# Patient Record
Sex: FEMALE | Race: WHITE | NOT HISPANIC OR LATINO | Employment: UNEMPLOYED | URBAN - METROPOLITAN AREA
[De-identification: names, ages, dates, MRNs, and addresses within clinical notes are randomized per-mention and may not be internally consistent; named-entity substitution may affect disease eponyms.]

---

## 2017-04-14 ENCOUNTER — OFFICE VISIT (OUTPATIENT)
Dept: URGENT CARE | Facility: URGENT CARE | Age: 27
End: 2017-04-14
Payer: COMMERCIAL

## 2017-04-14 VITALS
BODY MASS INDEX: 20.5 KG/M2 | DIASTOLIC BLOOD PRESSURE: 58 MMHG | WEIGHT: 127 LBS | RESPIRATION RATE: 14 BRPM | TEMPERATURE: 97.5 F | HEART RATE: 78 BPM | SYSTOLIC BLOOD PRESSURE: 102 MMHG | OXYGEN SATURATION: 99 %

## 2017-04-14 DIAGNOSIS — N89.8 VAGINAL DISCHARGE: Primary | ICD-10-CM

## 2017-04-14 DIAGNOSIS — B00.9 HERPES SIMPLEX VIRUS INFECTION: ICD-10-CM

## 2017-04-14 DIAGNOSIS — Z11.3 SCREEN FOR STD (SEXUALLY TRANSMITTED DISEASE): ICD-10-CM

## 2017-04-14 DIAGNOSIS — N89.8 VAGINAL SORE: ICD-10-CM

## 2017-04-14 LAB
MICRO REPORT STATUS: NORMAL
SPECIMEN SOURCE: NORMAL
WET PREP SPEC: NORMAL

## 2017-04-14 PROCEDURE — 99213 OFFICE O/P EST LOW 20 MIN: CPT | Performed by: FAMILY MEDICINE

## 2017-04-14 PROCEDURE — 87529 HSV DNA AMP PROBE: CPT | Mod: 91 | Performed by: FAMILY MEDICINE

## 2017-04-14 PROCEDURE — 87591 N.GONORRHOEAE DNA AMP PROB: CPT | Performed by: FAMILY MEDICINE

## 2017-04-14 PROCEDURE — 87491 CHLMYD TRACH DNA AMP PROBE: CPT | Performed by: FAMILY MEDICINE

## 2017-04-14 PROCEDURE — 87529 HSV DNA AMP PROBE: CPT | Performed by: FAMILY MEDICINE

## 2017-04-14 PROCEDURE — 87210 SMEAR WET MOUNT SALINE/INK: CPT | Performed by: FAMILY MEDICINE

## 2017-04-14 RX ORDER — ACYCLOVIR 400 MG/1
400 TABLET ORAL 3 TIMES DAILY
Qty: 30 TABLET | Refills: 0 | Status: SHIPPED | OUTPATIENT
Start: 2017-04-14 | End: 2024-01-25

## 2017-04-14 NOTE — PROGRESS NOTES
Chief Complaint   Patient presents with     Vaginal Problem     x2days blister on vaginal area, some discharge       STD     would like STD panel      SUBJECTIVE:   26 year old female complains of whitish vaginal discharge for 2 days.  Denies abnormal vaginal bleeding or significant pelvic pain or  She also has a sore in the rt vaginal area which is tender to touch   fever. No UTI symptoms. Denies history of known exposure to STD. Denies dyspareunia.    No LMP recorded.    OBJECTIVE:   She appears well, afebrile.  Abdomen: benign, soft, nontender, no masses.  Pelvic Exam: normal vagina and vulva, normal cervix without lesions or tenderness, uterus normal size anteverted, adenxa normal in size without tenderness.  Noticed  A 1cm sore in the rt labia minora  Urine dipstick: not done.    Results for orders placed or performed in visit on 04/14/17   Wet prep   Result Value Ref Range    Specimen Description Vagina     Wet Prep       No Trichomonas seen  No clue cells seen  No yeast seen      Micro Report Status FINAL 04/14/2017        ASSESSMENT:   Polly was seen today for vaginal problem and std.    Diagnoses and all orders for this visit:    Vaginal discharge  -     Wet prep    Vaginal sore  -     Herpes simplex culture    Screen for STD (sexually transmitted disease)  -     Neisseria gonorrhoeae PCR  -     Chlamydia trachomatis PCR    Herpes simplex virus infection  -     acyclovir (ZOVIRAX) 400 MG tablet; Take 1 tablet (400 mg) by mouth 3 times daily for 10 days          PLAN:   GC and chlamydia genprobe swabs sent to lab.  Treatment: Hygiene discussed  ROV prn if symptoms persist or worsen.

## 2017-04-14 NOTE — NURSING NOTE
"Chief Complaint   Patient presents with     Vaginal Problem     x2 blister on vaginal area, some discharge white        Initial /58  Pulse 78  Temp 97.5  F (36.4  C) (Oral)  Resp 14  Wt 127 lb (57.6 kg)  SpO2 99%  BMI 20.5 kg/m2 Estimated body mass index is 20.5 kg/(m^2) as calculated from the following:    Height as of 9/30/15: 5' 6\" (1.676 m).    Weight as of this encounter: 127 lb (57.6 kg).  Medication Reconciliation: complete       Carin Alvarado MA     "

## 2017-04-14 NOTE — MR AVS SNAPSHOT
"              After Visit Summary   2017    Polly Kent    MRN: [id number]           Patient Information     Date Of Birth          1990        Visit Information        Provider Department      2017 5:15 PM Zayda Lopez MD [facility]        Today's Diagnoses     Vaginal discharge    -  1    Vaginal sore        Screen for STD (sexually transmitted disease)        Herpes simplex virus infection           Follow-ups after your visit        Who to contact     If you have questions or need follow up information about today's clinic visit or your schedule please contact [facility] directly at [phone].  Normal or non-critical lab and imaging results will be communicated to you by [employer], letter or phone within 4 business days after the clinic has received the results. If you do not hear from us within 7 days, please contact the clinic through [employer] or phone. If you have a critical or abnormal lab result, we will notify you by phone as soon as possible.  Submit refill requests through [employer] or call your pharmacy and they will forward the refill request to us. Please allow 3 business days for your refill to be completed.          Additional Information About Your Visit        MyChart Information     [employer] lets you send messages to your doctor, view your test results, renew your prescriptions, schedule appointments and more. To sign up, go to [email or web address] . Click on \"Log in\" on the left side of the screen, which will take you to the Welcome page. Then click on \"Sign up Now\" on the right side of the page.     You will be asked to enter the access code listed below, as well as some personal information. Please follow the directions to create your username and password.     Your access code is: [email or web address]  Expires: 2017  6:53 PM     Your access code will  in 90 days. If you need help or a new code, please call " your [facility] clinic or [phone].        Care EveryWhere ID     This is your Care EveryWhere ID. This could be used by other organizations to access your [facility] medical records  [id number]        Your Vitals Were     Pulse Temperature Respirations Pulse Oximetry BMI (Body Mass Index)       78 97.5  F (36.4  C) (Oral) 14 99% 20.5 kg/m2        Blood Pressure from Last 3 Encounters:   04/14/17 102/58   12/15/16 112/54   12/02/16 120/72    Weight from Last 3 Encounters:   04/14/17 127 lb (57.6 kg)   12/15/16 125 lb (56.7 kg)   12/02/16 125 lb (56.7 kg)              We Performed the Following     Chlamydia trachomatis PCR     Herpes simplex culture     Neisseria gonorrhoeae PCR     Wet prep          Today's Medication Changes          These changes are accurate as of: 4/14/17  6:53 PM.  If you have any questions, ask your nurse or doctor.               Start taking these medicines.        Dose/Directions    acyclovir 400 MG tablet   Commonly known as:  ZOVIRAX   Used for:  Herpes simplex virus infection   Started by:  Zayda Lopez MD        Dose:  400 mg   Take 1 tablet (400 mg) by mouth 3 times daily for 10 days   Quantity:  30 tablet   Refills:  0            Where to get your medicines      These medications were sent to [email or web address] [id number] [place] [address] [address] AT [facility] [address]  [address], [facility] [address]    Hours:  24-hours Phone:  [phone]     acyclovir 400 MG tablet                Primary Care Provider    Physician No Ref-Primary       No address on file        Thank you!     Thank you for choosing [facility]  for your care. Our goal is always to provide you with excellent care. Hearing back from our patients is one way we can continue to improve our services. Please take a few minutes to complete the written survey that you may receive in the mail after your visit with us. Thank you!             Your Updated Medication List  - Protect others around you: Learn how to safely use, store and throw away your medicines at www.disposemymeds.org.          This list is accurate as of: 4/14/17  6:53 PM.  Always use your most recent med list.                   Brand Name Dispense Instructions for use    acyclovir 400 MG tablet    ZOVIRAX    30 tablet    Take 1 tablet (400 mg) by mouth 3 times daily for 10 days       albuterol 108 (90 BASE) MCG/ACT Inhaler    albuterol    1 Inhaler    Inhale 2 puffs into the lungs 4 times daily as needed for shortness of breath / dyspnea or wheezing       cetirizine 10 MG tablet    zyrTEC    30 tablet    Take 1 tablet (10 mg) by mouth every evening       ibuprofen 600 MG tablet    ADVIL/MOTRIN    30 tablet    Take 1 tablet (600 mg) by mouth every 6 hours as needed for moderate pain       methylPREDNISolone 4 MG tablet    MEDROL DOSEPAK    21 tablet    Follow package instructions

## 2017-04-15 LAB
SPECIMEN SOURCE: ABNORMAL
STATUS - QUEST: ABNORMAL
VZV SPEC QL CULT: ABNORMAL

## 2017-04-17 LAB
C TRACH DNA SPEC QL NAA+PROBE: NORMAL
HSV1 DNA SPEC QL NAA+PROBE: NEGATIVE
HSV2 DNA SPEC QL NAA+PROBE: NORMAL
N GONORRHOEA DNA SPEC QL NAA+PROBE: NORMAL
SPECIMEN SOURCE: NORMAL

## 2017-07-03 ENCOUNTER — TELEPHONE (OUTPATIENT)
Dept: URGENT CARE | Facility: URGENT CARE | Age: 27
End: 2017-07-03

## 2017-07-03 NOTE — TELEPHONE ENCOUNTER
Patient called asking for lab results from April. Notified of lab results. Patient requested to have the lab results printed out and was given [facility]. Pt had no further questions.

## 2019-03-19 ENCOUNTER — APPOINTMENT (OUTPATIENT)
Dept: OBGYN | Facility: CLINIC | Age: 29
End: 2019-03-19
Payer: COMMERCIAL

## 2022-05-04 ENCOUNTER — OFFICE VISIT (OUTPATIENT)
Dept: URGENT CARE | Facility: URGENT CARE | Age: 32
End: 2022-05-04
Payer: COMMERCIAL

## 2022-05-04 VITALS
TEMPERATURE: 98 F | HEART RATE: 67 BPM | WEIGHT: 136 LBS | RESPIRATION RATE: 16 BRPM | SYSTOLIC BLOOD PRESSURE: 117 MMHG | BODY MASS INDEX: 21.95 KG/M2 | OXYGEN SATURATION: 100 % | DIASTOLIC BLOOD PRESSURE: 70 MMHG

## 2022-05-04 DIAGNOSIS — N89.8 VAGINAL DISCHARGE: ICD-10-CM

## 2022-05-04 DIAGNOSIS — R30.0 DYSURIA: Primary | ICD-10-CM

## 2022-05-04 LAB
ALBUMIN UR-MCNC: ABNORMAL MG/DL
APPEARANCE UR: CLEAR
BACTERIA #/AREA URNS HPF: ABNORMAL /HPF
BILIRUB UR QL STRIP: NEGATIVE
CLUE CELLS: ABNORMAL
COLOR UR AUTO: YELLOW
GLUCOSE UR STRIP-MCNC: NEGATIVE MG/DL
HGB UR QL STRIP: ABNORMAL
KETONES UR STRIP-MCNC: 15 MG/DL
LEUKOCYTE ESTERASE UR QL STRIP: NEGATIVE
MUCOUS THREADS #/AREA URNS LPF: PRESENT /LPF
NITRATE UR QL: NEGATIVE
PH UR STRIP: 6.5 [PH] (ref 5–7)
RBC #/AREA URNS AUTO: ABNORMAL /HPF
SP GR UR STRIP: 1.02 (ref 1–1.03)
SQUAMOUS #/AREA URNS AUTO: ABNORMAL /LPF
TRICHOMONAS, WET PREP: ABNORMAL
UROBILINOGEN UR STRIP-ACNC: 1 E.U./DL
WBC #/AREA URNS AUTO: ABNORMAL /HPF
WBC'S/HIGH POWER FIELD, WET PREP: ABNORMAL
YEAST, WET PREP: ABNORMAL

## 2022-05-04 PROCEDURE — 81001 URINALYSIS AUTO W/SCOPE: CPT | Performed by: PHYSICIAN ASSISTANT

## 2022-05-04 PROCEDURE — 87210 SMEAR WET MOUNT SALINE/INK: CPT | Performed by: PHYSICIAN ASSISTANT

## 2022-05-04 PROCEDURE — 99204 OFFICE O/P NEW MOD 45 MIN: CPT | Performed by: PHYSICIAN ASSISTANT

## 2022-05-04 RX ORDER — CEFDINIR 300 MG/1
300 CAPSULE ORAL 2 TIMES DAILY
Qty: 20 CAPSULE | Refills: 0 | Status: SHIPPED | OUTPATIENT
Start: 2022-05-04 | End: 2022-05-14

## 2022-05-05 ENCOUNTER — TELEPHONE (OUTPATIENT)
Dept: PEDIATRICS | Facility: CLINIC | Age: 32
End: 2022-05-05
Payer: COMMERCIAL

## 2022-05-05 ENCOUNTER — TELEPHONE (OUTPATIENT)
Dept: URGENT CARE | Facility: URGENT CARE | Age: 32
End: 2022-05-05
Payer: COMMERCIAL

## 2022-05-05 NOTE — TELEPHONE ENCOUNTER
TO UC provider     Pt states she just missed a call from UC staff     No notes in chart currently - thinks it might be regarding results     Per pt please leave a detailed message    Bonny BROOKS, Triage RN  [facility]

## 2022-05-05 NOTE — TELEPHONE ENCOUNTER
The specimen that the patient left for STD testing , it spills all over the transport bag, do you want the patient to recollect?   Ramon Ga MA

## 2022-05-07 NOTE — TELEPHONE ENCOUNTER
Call the patient and she is feeling better, so she decided not to do the std testing.   Ramon Ga MA

## 2022-05-12 NOTE — PROGRESS NOTES
SUBJECTIVE:   Polly Kent is a 31 year old female who  presents today for a possible UTI. Symptoms of dysuria and frequency have been going on for 1week(s).  Hematuria no.  still presentand moderate.  There is no history of fever, chills, nausea or vomiting.  No history of vaginal or penile discharge. This patient does have a history of urinary tract infections. Patient denies long duration, rigors, flank pain, temperature > 101 degrees F., Vomiting, significant nausea or diarrhea, taking Coumadin and GFR less than 30 within the last year or vaginal discharge, vaginal odor, vaginal itching and dyspareunia (pain in labia/pelvis)    Has been taking ntrofurantoin with no relief     Past Medical History:   Diagnosis Date     Anemia     hx with previous child, none now     HSIL on Pap smear 6/2012    colp:VICKIE II & III referred to gyn/onc     Current Outpatient Medications   Medication Sig Dispense Refill     cefdinir (OMNICEF) 300 MG capsule Take 1 capsule (300 mg) by mouth 2 times daily for 10 days 20 capsule 0     acyclovir (ZOVIRAX) 400 MG tablet Take 1 tablet (400 mg) by mouth 3 times daily for 10 days 30 tablet 0     albuterol (ALBUTEROL) 108 (90 BASE) MCG/ACT Inhaler Inhale 2 puffs into the lungs 4 times daily as needed for shortness of breath / dyspnea or wheezing 1 Inhaler 0     cetirizine (ZYRTEC) 10 MG tablet Take 1 tablet (10 mg) by mouth every evening (Patient not taking: Reported on 5/4/2022) 30 tablet 1     ibuprofen (ADVIL,MOTRIN) 600 MG tablet Take 1 tablet (600 mg) by mouth every 6 hours as needed for moderate pain (Patient not taking: Reported on 5/4/2022) 30 tablet 0     methylPREDNISolone (MEDROL DOSEPAK) 4 MG tablet Follow package instructions (Patient not taking: Reported on 5/4/2022) 21 tablet 0     Social History     Tobacco Use     Smoking status: Never Smoker     Smokeless tobacco: Never Used   Substance Use Topics     Alcohol use: No       ROS:   Review of systems negative except as  stated above.    OBJECTIVE:  /70   Pulse 67   Temp 98  F (36.7  C) (Tympanic)   Resp 16   Wt 61.7 kg (136 lb)   SpO2 100%   BMI 21.95 kg/m    GENERAL APPEARANCE: healthy, alert and no distress  RESP: lungs clear to auscultation - no rales, rhonchi or wheezes  CV: regular rates and rhythm, normal S1 S2, no murmur noted  ABDOMEN:  soft, nontender, no HSM or masses and bowel sounds normal  BACK: No CVA tenderness  SKIN: no suspicious lesions or rashes  NEURO: Normal strength and tone, sensory exam grossly normal,  normal speech and mentation      Results for orders placed or performed in visit on 05/04/22   UA macro with reflex to Microscopic and Culture - Clinc Collect     Status: Abnormal    Specimen: Urine, Midstream   Result Value Ref Range    Color Urine Yellow Colorless, Straw, Light Yellow, Yellow    Appearance Urine Clear Clear    Glucose Urine Negative Negative mg/dL    Bilirubin Urine Negative Negative    Ketones Urine 15  (A) Negative mg/dL    Specific Gravity Urine 1.025 1.003 - 1.035    Blood Urine Small (A) Negative    pH Urine 6.5 5.0 - 7.0    Protein Albumin Urine Trace (A) Negative mg/dL    Urobilinogen Urine 1.0 0.2, 1.0 E.U./dL    Nitrite Urine Negative Negative    Leukocyte Esterase Urine Negative Negative   Urine Microscopic     Status: Abnormal   Result Value Ref Range    Bacteria Urine Few (A) None Seen /HPF    RBC Urine None Seen 0-2 /HPF /HPF    WBC Urine 5-10 (A) 0-5 /HPF /HPF    Squamous Epithelials Urine Moderate (A) None Seen /LPF    Mucus Urine Present (A) None Seen /LPF    Narrative    Urine Culture not indicated   Wet prep - Clinic Collect     Status: Abnormal    Specimen: Vagina; Swab   Result Value Ref Range    Trichomonas Absent Absent    Yeast Absent Absent    Clue Cells Absent Absent    WBCs/high power field 1+ (A) None       ASSESSMENT:   (R30.0) Dysuria  (primary encounter diagnosis)  Plan: UA macro with reflex to Microscopic and Culture        - Clinc Collect, Urine  Microscopic, cefdinir         (OMNICEF) 300 MG capsule      (N89.8) Vaginal discharge  Plan: Wet prep - Clinic Collect, NEISSERIA GONORRHOEA        PCR, CHLAMYDIA TRACHOMATIS PCR      Red flags and emergent follow up discussed, and understood by patient  Follow up with PCP if symptoms worsen or fail to improve      There are no Patient Instructions on file for this visit.

## 2022-06-05 ENCOUNTER — HEALTH MAINTENANCE LETTER (OUTPATIENT)
Age: 32
End: 2022-06-05

## 2022-10-15 ENCOUNTER — HEALTH MAINTENANCE LETTER (OUTPATIENT)
Age: 32
End: 2022-10-15

## 2023-06-17 ENCOUNTER — HEALTH MAINTENANCE LETTER (OUTPATIENT)
Age: 33
End: 2023-06-17

## 2024-01-12 ENCOUNTER — TELEPHONE (OUTPATIENT)
Dept: MIDWIFE SERVICES | Facility: CLINIC | Age: 34
End: 2024-01-12
Payer: COMMERCIAL

## 2024-01-12 ENCOUNTER — PRE VISIT (OUTPATIENT)
Dept: MATERNAL FETAL MEDICINE | Facility: CLINIC | Age: 34
End: 2024-01-12
Payer: COMMERCIAL

## 2024-01-12 ENCOUNTER — TRANSCRIBE ORDERS (OUTPATIENT)
Dept: MATERNAL FETAL MEDICINE | Facility: CLINIC | Age: 34
End: 2024-01-12
Payer: COMMERCIAL

## 2024-01-12 DIAGNOSIS — Z34.81 ENCOUNTER FOR SUPERVISION OF OTHER NORMAL PREGNANCY IN FIRST TRIMESTER: Primary | ICD-10-CM

## 2024-01-12 DIAGNOSIS — O26.90 PREGNANCY RELATED CONDITION, ANTEPARTUM: Primary | ICD-10-CM

## 2024-01-12 NOTE — TELEPHONE ENCOUNTER
[facility] Call Center    Phone Message    May a detailed message be left on voicemail: yes     Reason for Call: Other: Pt calling in trying to schedule her initial ob appointments. Pt is really wanting the genetic testing done and pt is already 11w 6d today. Writer reached out in secure chat to ask what is the time frame that she can get that done at. Nurse responded with 11w-13w6d. Writer found pt a first provider visit at that 13w 5d morena. Lynner was able to find an US the day before that appointment. The problem that the writer is having is with the nurse ob intake appointment happens after the US but before the provider visit. Writer did schedule the first available ob intake . Please call pt to try to schedule the ob intake before the US. Thank you!     Action Taken: Message routed to:  Other: obgyn    Travel Screening: Not Applicable

## 2024-01-12 NOTE — TELEPHONE ENCOUNTER
Pt won't see MD until 13w5d, wanting genetic counseling  Order placed for MFM referral/genetic screen  Called pt to inform her of MFM referral, no answer, VM full.  Grisel Keen, CINDY on 1/12/2024 at 12:43 PM    Pt scheduled for MFM visits. Closing TE.  Grisel Keen RN on 1/12/2024 at 4:10 PM

## 2024-01-16 ENCOUNTER — HOSPITAL ENCOUNTER (OUTPATIENT)
Dept: ULTRASOUND IMAGING | Facility: CLINIC | Age: 34
Discharge: HOME | End: 2024-01-16
Attending: OBSTETRICS & GYNECOLOGY
Payer: COMMERCIAL

## 2024-01-16 ENCOUNTER — OFFICE VISIT (OUTPATIENT)
Dept: MATERNAL FETAL MEDICINE | Facility: CLINIC | Age: 34
End: 2024-01-16
Attending: OBSTETRICS & GYNECOLOGY
Payer: COMMERCIAL

## 2024-01-16 DIAGNOSIS — Z36.9 FIRST TRIMESTER SCREENING: Primary | ICD-10-CM

## 2024-01-16 DIAGNOSIS — O26.90 PREGNANCY RELATED CONDITION, ANTEPARTUM: ICD-10-CM

## 2024-01-16 PROCEDURE — 76813 OB US NUCHAL MEAS 1 GEST: CPT | Mod: 26 | Performed by: OBSTETRICS & GYNECOLOGY

## 2024-01-16 PROCEDURE — 76813 OB US NUCHAL MEAS 1 GEST: CPT

## 2024-01-16 NOTE — PROGRESS NOTES
"Please see \"Imaging\" tab under \"Chart Review\" for details of today's US at the [facility].    Abel Alvarez MD  Maternal-Fetal Medicine    "

## 2024-01-24 ENCOUNTER — ANCILLARY PROCEDURE (OUTPATIENT)
Dept: ULTRASOUND IMAGING | Facility: CLINIC | Age: 34
End: 2024-01-24
Attending: ADVANCED PRACTICE MIDWIFE
Payer: COMMERCIAL

## 2024-01-24 DIAGNOSIS — Z32.01 PREGNANCY TEST POSITIVE: Primary | ICD-10-CM

## 2024-01-24 DIAGNOSIS — Z34.90 SUPERVISION OF NORMAL PREGNANCY: Primary | ICD-10-CM

## 2024-01-24 DIAGNOSIS — Z32.01 PREGNANCY TEST POSITIVE: ICD-10-CM

## 2024-01-24 LAB
ABO/RH(D): NORMAL
ANTIBODY SCREEN: NEGATIVE
SPECIMEN EXPIRATION DATE: NORMAL

## 2024-01-24 PROCEDURE — 76801 OB US < 14 WKS SINGLE FETUS: CPT | Mod: 26 | Performed by: OBSTETRICS & GYNECOLOGY

## 2024-01-24 PROCEDURE — 76801 OB US < 14 WKS SINGLE FETUS: CPT

## 2024-01-25 ENCOUNTER — VIRTUAL VISIT (OUTPATIENT)
Dept: OBGYN | Facility: CLINIC | Age: 34
End: 2024-01-25
Payer: COMMERCIAL

## 2024-01-25 ENCOUNTER — MEDICAL CORRESPONDENCE (OUTPATIENT)
Dept: HEALTH INFORMATION MANAGEMENT | Facility: CLINIC | Age: 34
End: 2024-01-25

## 2024-01-25 ENCOUNTER — PRENATAL OFFICE VISIT (OUTPATIENT)
Dept: MIDWIFE SERVICES | Facility: CLINIC | Age: 34
End: 2024-01-25
Payer: COMMERCIAL

## 2024-01-25 ENCOUNTER — LAB (OUTPATIENT)
Dept: LAB | Facility: CLINIC | Age: 34
End: 2024-01-25
Attending: ADVANCED PRACTICE MIDWIFE
Payer: COMMERCIAL

## 2024-01-25 ENCOUNTER — OFFICE VISIT (OUTPATIENT)
Dept: MATERNAL FETAL MEDICINE | Facility: CLINIC | Age: 34
End: 2024-01-25
Attending: ADVANCED PRACTICE MIDWIFE
Payer: COMMERCIAL

## 2024-01-25 VITALS — BODY MASS INDEX: 21.95 KG/M2 | HEIGHT: 66 IN

## 2024-01-25 VITALS
SYSTOLIC BLOOD PRESSURE: 106 MMHG | BODY MASS INDEX: 21.47 KG/M2 | DIASTOLIC BLOOD PRESSURE: 58 MMHG | HEART RATE: 92 BPM | WEIGHT: 133 LBS | OXYGEN SATURATION: 99 %

## 2024-01-25 DIAGNOSIS — O09.92 ENCOUNTER FOR SUPERVISION OF HIGH RISK PREGNANCY IN SECOND TRIMESTER, ANTEPARTUM: ICD-10-CM

## 2024-01-25 DIAGNOSIS — O26.90 PREGNANCY RELATED CONDITION, ANTEPARTUM: ICD-10-CM

## 2024-01-25 DIAGNOSIS — N96 HISTORY OF MULTIPLE MISCARRIAGES: Primary | ICD-10-CM

## 2024-01-25 DIAGNOSIS — Z36.9 PRENATAL SCREENING ENCOUNTER: Primary | ICD-10-CM

## 2024-01-25 DIAGNOSIS — Z36.9 PRENATAL SCREENING ENCOUNTER: ICD-10-CM

## 2024-01-25 DIAGNOSIS — Z23 NEED FOR TDAP VACCINATION: ICD-10-CM

## 2024-01-25 DIAGNOSIS — Z12.4 SCREENING FOR CERVICAL CANCER: ICD-10-CM

## 2024-01-25 DIAGNOSIS — Z34.83 ENCOUNTER FOR SUPERVISION OF OTHER NORMAL PREGNANCY IN THIRD TRIMESTER: Primary | ICD-10-CM

## 2024-01-25 PROBLEM — S76.109A INJURY OF QUADRICEPS MUSCLE: Status: ACTIVE | Noted: 2022-07-07

## 2024-01-25 PROBLEM — O99.013 ANEMIA DURING PREGNANCY IN THIRD TRIMESTER: Status: RESOLVED | Noted: 2019-10-30 | Resolved: 2024-01-25

## 2024-01-25 PROBLEM — Z86.2 HISTORY OF ANEMIA: Status: ACTIVE | Noted: 2021-02-25

## 2024-01-25 PROBLEM — S87.82XA: Status: ACTIVE | Noted: 2017-11-20

## 2024-01-25 PROBLEM — O99.013 ANEMIA DURING PREGNANCY IN THIRD TRIMESTER: Status: ACTIVE | Noted: 2019-10-30

## 2024-01-25 PROBLEM — N92.1 MENORRHAGIA WITH IRREGULAR CYCLE: Status: ACTIVE | Noted: 2021-02-25

## 2024-01-25 LAB
ALBUMIN UR-MCNC: NEGATIVE MG/DL
APPEARANCE UR: CLEAR
BILIRUB UR QL STRIP: NEGATIVE
COLOR UR AUTO: YELLOW
ERYTHROCYTE [DISTWIDTH] IN BLOOD BY AUTOMATED COUNT: 12 % (ref 10–15)
GLUCOSE UR STRIP-MCNC: NEGATIVE MG/DL
HCT VFR BLD AUTO: 32.4 % (ref 35–47)
HGB BLD-MCNC: 10.7 G/DL (ref 11.7–15.7)
HGB UR QL STRIP: NEGATIVE
KETONES UR STRIP-MCNC: NEGATIVE MG/DL
LEUKOCYTE ESTERASE UR QL STRIP: NEGATIVE
MCH RBC QN AUTO: 28.6 PG (ref 26.5–33)
MCHC RBC AUTO-ENTMCNC: 33 G/DL (ref 31.5–36.5)
MCV RBC AUTO: 87 FL (ref 78–100)
NITRATE UR QL: NEGATIVE
PH UR STRIP: 6 [PH] (ref 5–7)
PLATELET # BLD AUTO: 226 10E3/UL (ref 150–450)
RBC # BLD AUTO: 3.74 10E6/UL (ref 3.8–5.2)
SP GR UR STRIP: 1.02 (ref 1–1.03)
UROBILINOGEN UR STRIP-ACNC: 0.2 E.U./DL
WBC # BLD AUTO: 7.7 10E3/UL (ref 4–11)

## 2024-01-25 PROCEDURE — 86900 BLOOD TYPING SEROLOGIC ABO: CPT | Performed by: ADVANCED PRACTICE MIDWIFE

## 2024-01-25 PROCEDURE — G0124 SCREEN C/V THIN LAYER BY MD: HCPCS | Performed by: PATHOLOGY

## 2024-01-25 PROCEDURE — 86803 HEPATITIS C AB TEST: CPT | Performed by: ADVANCED PRACTICE MIDWIFE

## 2024-01-25 PROCEDURE — 81003 URINALYSIS AUTO W/O SCOPE: CPT | Performed by: ADVANCED PRACTICE MIDWIFE

## 2024-01-25 PROCEDURE — 87340 HEPATITIS B SURFACE AG IA: CPT | Performed by: ADVANCED PRACTICE MIDWIFE

## 2024-01-25 PROCEDURE — 99203 OFFICE O/P NEW LOW 30 MIN: CPT | Performed by: ADVANCED PRACTICE MIDWIFE

## 2024-01-25 PROCEDURE — 86901 BLOOD TYPING SEROLOGIC RH(D): CPT | Performed by: ADVANCED PRACTICE MIDWIFE

## 2024-01-25 PROCEDURE — 87624 HPV HI-RISK TYP POOLED RSLT: CPT | Performed by: ADVANCED PRACTICE MIDWIFE

## 2024-01-25 PROCEDURE — G0145 SCR C/V CYTO,THINLAYER,RESCR: HCPCS | Performed by: ADVANCED PRACTICE MIDWIFE

## 2024-01-25 PROCEDURE — 96040 HC GENETIC COUNSELING, EACH 30 MINUTES: CPT | Performed by: GENETIC COUNSELOR, MS

## 2024-01-25 PROCEDURE — 36415 COLL VENOUS BLD VENIPUNCTURE: CPT | Performed by: ADVANCED PRACTICE MIDWIFE

## 2024-01-25 PROCEDURE — 87389 HIV-1 AG W/HIV-1&-2 AB AG IA: CPT | Performed by: ADVANCED PRACTICE MIDWIFE

## 2024-01-25 PROCEDURE — 86762 RUBELLA ANTIBODY: CPT | Performed by: ADVANCED PRACTICE MIDWIFE

## 2024-01-25 PROCEDURE — 36415 COLL VENOUS BLD VENIPUNCTURE: CPT

## 2024-01-25 PROCEDURE — 86780 TREPONEMA PALLIDUM: CPT | Performed by: ADVANCED PRACTICE MIDWIFE

## 2024-01-25 PROCEDURE — 86850 RBC ANTIBODY SCREEN: CPT | Performed by: ADVANCED PRACTICE MIDWIFE

## 2024-01-25 PROCEDURE — 87086 URINE CULTURE/COLONY COUNT: CPT | Performed by: ADVANCED PRACTICE MIDWIFE

## 2024-01-25 PROCEDURE — 85027 COMPLETE CBC AUTOMATED: CPT | Performed by: ADVANCED PRACTICE MIDWIFE

## 2024-01-25 RX ORDER — NITROFURANTOIN 25; 75 MG/1; MG/1
100 CAPSULE ORAL 2 TIMES DAILY
COMMUNITY
End: 2024-02-15

## 2024-01-25 RX ORDER — ACETAMINOPHEN 500 MG
1000 TABLET ORAL
COMMUNITY
Start: 2022-07-07 | End: 2024-01-25

## 2024-01-25 NOTE — PROGRESS NOTES
14w0d   Polly Kent is a 33 year old who presents to the clinic for an new ob visit.  She is a previous CNM patient. Pregnancy is going well so far, no sickness.   Estimated Date of Delivery: Jul 25, 2024 is calculated from Patient's last menstrual period was 10/19/2023.     She has had bleeding since her LMP.  The bleeding was spotting on two occasions after intercourse and hasn't happened recently.  She has had mild nausea. Weigh loss has not occurred.   This was not a planned pregnancy.   FOB is involved,  Aarion     INFECTION HISTORY  HIV: no  Hepatitis B: no  Hepatitis C: no  Syphilis:  no  Tuberculosis: no   PPD- no  Herpes self: no  Herpes partner:  no  Chlamydia:  no  Gonorrhea:  no  HPV: no  BV:  no  Trichomonis:  no  Chicken Pox:  YES  ====================================================  GENETIC SCREENING  Genetic screening reviewed. High Risk? Had NIPT today  ====================================================  PERSONAL/SOCIAL HISTORY  Lives lives with their family.  Employment: some part time work, sometimes home with kids.  Her job involves light activity .  HX OF ABUSE: no  =====================================================   REVIEW OF SYSTEMS  CONSTITUTIONAL: NEGATIVE for fever, chills  EYES: NEGATIVE for vision changes   RESP: NEGATIVE for significant cough or SOB  CV: NEGATIVE for chest pain, palpitations   GI: NEGATIVE for nausea, abdominal pain, heartburn, or change in bowel habits  : NEGATIVE for frequency, dysuria, or hematuria  MUSCULOSKELETAL: NEGATIVE for significant arthralgias or myalgia  NEURO: NEGATIVE for weakness, dizziness or paresthesias or headache  ====================================================    PHYSICAL EXAM:  /58   Pulse 92   Wt 60.3 kg (133 lb)   LMP 10/19/2023   SpO2 99%   BMI 21.47 kg/m    BMI- Body mass index is 21.47 kg/m .,     RECOMMENDED WEIGHT GAIN: 25-35 lbs.  PHQ9- Today's Depression Rating was 0  GENERAL:  Pleasant pregnant female,  alert, well groomed.   SKIN:  Warm and dry, without lesions or rashes  HEAD: Symmetrical features.  EYES:  PERRLA,   MOUTH:  Buccal mucosa pink, moist without lesions.    NECK:  Thyroid without enlargement and nodules.  Lymph nodes not palpable.   LUNGS:  Clear to auscultation.  BREAST:  Symmetrical.  No dominant, fixed or suspicious masses are noted.  No skin or nipple changes or axillary nodes.  Self exam is taught and encouraged.  Nipples everted.      HEART:  RRR without murmur.  ABDOMEN: Soft without masses , tenderness or organomegaly.  No CVA tenderness. No scars noted.     MUSCULOSKELETAL:  Full range of motion  EXTREMITIES:  No edema. No significant varicosities.     GENITALIA:  BUS WNL, no lesions noted   VAGINA:  Pink, normal rugae and discharge normal and physiologic,   CERVIX:  smooth, without discharge or CMT ; Pap smear collected      =========================================  ASSESSMENT:  14w0d,  14w0d  (Z34.83) Encounter for supervision of other normal pregnancy in third trimester  (primary encounter diagnosis)  Comment: NOB labs today. RTC in 3-4 weeks for msAFP, then in 6 weeks for anatomy US  Plan: US OB > 14 Weeks    (Z12.4) Screening for cervical cancer  Plan: Pap imaged thin layer screen with HPV -         recommended age 30 - 65 years (select HPV order        below)      PREGNANCY AT RISK? no  ==========================================  PLAN:  Instructed on use of triage nurse line and contacting the on call CNM after hours for an urgent need such as fever, vagina bleeding, bladder or vaginal infection, rupture of membranes,  or term labor.    Discussed the indications, uses for and false positives for quad screen, nuchal translucency and fetal survey ultrasound at 18-20 weeks gestation. Will inform us at the next visit if she wished to avail herself of these screens.  Instructed on best evidence for: weight gain for her BMI for pregnancy; healthy diet and foods to  avoid; exercise and activity during pregnancy;avoiding exposure to toxoplasmosis; and maintenance of a generally healthy lifestyle.   Discussed the harms, benefits, side effects and alternative therapies for current prescribed and OTC medications.    RUBEN SidhuM
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
## 2024-01-25 NOTE — PROGRESS NOTES
[facility]  Genetic Counseling Consult    Patient:  Ploly Kent  Preferred Name: Polly YOB: 1990   Date of Service:  1/25/24   MRN: [id number]    Polly was seen at the [facility] for genetic consultation. The indication for genetic counseling is desire to discuss options for genetic screening and diagnostics. The patient was accompanied to this visit by their partner.    The session was conducted in English.      IMPRESSION/ PLAN   1. Polly has not had genetic screening in this pregnancy but elected to have screening today.     2. During today's [facility] visit, Polly had a blood draw for expanded non-invasive prenatal testing (also called NIPT, NIPS, or cell-free DNA) through [facility]. The expanded NIPT screens for trisomy 21, 18, and 13 and 22q11.2 deletion syndrome. The patient opted to screen for sex chromosome aneuploidies, including reported fetal sex.  In accordance with current guidelines, other microdeletion syndromes and rare autosomal trisomies were not included, but reflex to include these conditions remains available with expanded NIPT if there is an indication later in the pregnancy. Results are expected in 7-14 days. The patient will be called with results and if they do not answer they requested a detailed message with results on their voicemail, including the predicted fetal sex information.  Patient was informed that results, including fetal sex, will be available in [facility].    3. Since the patient chose aneuploidy screening via NIPT, quad screen is NOT recommended in the second trimester. If the patient desires screening for open neural tube defects, maternal serum AFP only is recommended, ideally between 16-18 weeks gestation.    4. Polly had a nuchal translucency ultrasound on 01/16/2024. Please see the ultrasound report for further details.    5. Further recommendations include a fetal  "anatomy ultrasound around 18-20 weeks, which will most likely be completed with their primary OB provider.    PREGNANCY HISTORY   /Parity:       [place] pregnancy history is significant for:   08/10: Term, female  : Term, male  10/19: Term, female  2 first trimester miscarriages with separate partners  4 abortions without complications per patient report    CURRENT PREGNANCY   Current Age: 33 year old   Age at Delivery: 33 year old  CINDY: 2024, by Last Menstrual Period                                   Gestational Age: 14w0d  This pregnancy is a single gestation.   This pregnancy was conceived spontaneously.    MEDICAL HISTORY   Polly rodriguez reported medical history is not expected to impact pregnancy management or risks to fetal development.       FAMILY HISTORY   A three-generation pedigree was obtained today and is scanned under the \"Media\" tab in Epic. The family history was reported by Polly and their partner.    The following significant findings were reported today:   The father of the pregnancy, Jl, is healthy. His family history is unremarkable. He has one healthy daughter from a previous partner  Polly has two daughters and a son, all healthy all from separate partners.    Polly's maternal uncle was born with bilateral deafness, for reasons unknown to Polly. He has a son born with cleft lip and palate but no hearing loss. Her uncle and cousin have no other health concerns  We reviewed that hearing loss is relatively common in the human population in that profound congenital hearing loss is estimated to occur in about 1 in 1000 births.  Approximately half of cases are thought to be due to environmental factors (acoustic trauma, ototoxic drug exposure, and bacterial or viral infections such as rubella or cytomegalovirus) and half due to genetic causes. The majority (70%) of cases of congenital deafness associated with genetic factors are classified as non-syndromic (the deafness is not " associated with other clinical findings that define a recognized syndrome). In the remaining 30%, one of more than 400 forms of syndromic deafness can be diagnosed because of other associated clinical findings.  It is difficult to determine the recurrence risk for family members without a known cause for the hearing loss. Inheritance patterns of genetic hearing loss can include AD, AR, X-linked and mitochondrial.    Karl has one niece and one nephew with autism  Autism spectrum disorders (ASD) are characterized by abnormalities in language and social cognition. Individuals with ASD typically have difficulty with communication and interaction with others, intellectual disability, restricted interests, and repetitive behaviors. The cause of ASD is currently unknown. In less than 1% of all individuals with ASD, it is a feature of a certain genetic condition such as Down syndrome, fragile X syndrome, or Rett syndrome. However, in most cases the cause may be multifactorial which means a combination of genetic and environmental factors. The genetic causes or predispositions are being researched and many have been suggested. Currently, family and twin studies are most useful to provide recurrence risks to individuals with a family history of ASD. If an individual is affected with ASD there is an estimated 2-18.7% chance for a sibling of the individual to also be affected. Estimates for second degree relatives are currently unknown but could be elevated from the general population risk.    Otherwise, the reported family history is unremarkable for multiple miscarriages, stillbirths, birth defects, intellectual disabilities, known genetic conditions, and consanguinity.       RISK ASSESSMENT FOR INHERITED CONDITIONS AND CARRIER SCREENING OPTIONS   Expanded carrier screening is available to screen for autosomal recessive conditions and X-linked conditions in a large list of genes. Carrier screening does not test the  pregnancy but gives a risk assessment for the pregnancy and future pregnancies to have the condition. Expanded carrier screening is designed to identify carrier status for conditions that are primarily childhood or adolescent onset. Expanded carrier screening does not evaluate for adult-onset conditions such as hereditary cancer syndromes, dementia/ Alzheimer's disease, or cardiovascular disease risk factors. Additionally, expanded carrier screening is not comprehensive for all known genetic diseases or inherited conditions. Carrier screening does not test for all genetic and health conditions or risk factors.     Autosomal recessive conditions happen when a mutation has been inherited from the egg and sperm and include conditions like cystic fibrosis, thalassemia, hearing loss, spinal muscular atrophy, and more. We reviewed that when both biological parents carry a harmful genetic change in a gene associated with autosomal recessive inheritance, each of their pregnancies has a 1 in 4 (25%) chance to be affected by that condition. X-linked conditions happen when a mutation has been inherited from the egg and include conditions like fragile X syndrome.With x-linked conditions, the specific risk generally depends on the chromosomal sex of the fetus, with XY individuals (generally male) being most severely affected.     [place] screening was reviewed. [place]  Screening    Polly's daughter (from different partner) is a carrier of sickle cell disease, identified on  screening. In  Polly did have hemoglobin S testing which was not detected, which means Polly is NOT a carrier for sickle cell disease and her daughter inherited that from her father. Polly did not have comprehensive hemoglobin electrophoresis so this result does not provide a risk assessment on other hemoglobinopathies     The patient declined the carrier screening options and declined a thorough discussion of the options. They are aware  the option will remain, and they can contact us if they would like to pursue screening. See below for the more detailed information we dicussed.    RISK ASSESSMENT FOR CHROMOSOME CONDITIONS   We explained that the risk for fetal chromosome abnormalities increases with maternal age. We discussed specific features of common chromosome abnormalities, including Down syndrome, trisomy 13, trisomy 18, and sex chromosome trisomies.    At age 33 at midtrimester, the risk to have a baby with Down syndrome is 1 in 421.   At age 33 at midtrimester, the risk to have a baby with any chromosome abnormality is 1 in 217.    Polly has not had genetic screening in this pregnancy but elected to have screening today.      GENETIC TESTING OPTIONS   Genetic testing during a pregnancy includes screening and diagnostic procedures.      Screening tests are non-invasive which means no risk to the pregnancy and includes ultrasounds and blood work. The benefits and limitations of screening were reviewed. Screening tests provide a risk assessment (chance) specific to the pregnancy for certain fetal chromosome abnormalities but cannot definitively diagnose or exclude a fetal chromosome abnormality. Follow-up genetic counseling and consideration of diagnostic testing is recommended with any abnormal screening result. Diagnostic testing during a pregnancy is more certain and can test for more conditions. However, the tests do have a risk of miscarriage that requires careful consideration. These tests can detect fetal chromosome abnormalities with greater than 99% certainty. Results can be compromised by maternal cell contamination or mosaicism and are limited by the resolution of current genetic testing technology.     There is no screening or diagnostic test that detects all forms of birth defects or intellectual disability.     We discussed the following screening options:     Non-invasive prenatal testing (NIPT)  Also called cell-free DNA  screening because it detects chromosomes from the placenta in the pregnant person's blood  Can be done any time after 10 weeks gestation  Standard recommendation for NIPT screens for trisomy 21, trisomy 18, trisomy 13, with the option of adding sex chromosome aneuploidies, without or without predicted sex  Cannot screen for open neural tube defects, maternal serum AFP after 15 weeks is recommended  New NIPT options include screening for other trisomies, microdeletion syndromes, and in some cases fetal blood antigens. Guidelines do not recommend these conditions are included in standard screening. These options have limitations and should be discussed with a genetic counselor.   However, current (2023) ACMG guidelines do recommend that screening for one microdeletion syndrome, called 22q11.2 deletion syndrome be offered to all pregnant patients. 22q11.2 deletion syndrome has an estimated prevalence of 1 in 990 to 1 in 2148 (0.05-0.1%). Risk is not thought to increase with maternal age. Clinical features are variable but include congenital heart defects, cleft palate, developmental delays, immune system deficiencies, and hearing loss. Approximately 90% of cases are de samuel (a sporadic new change in a pregnancy). Cell-free DNA screening for 22q11.2 deletion syndrome is available (Expanded NIPS through [employer]). We discussed the limitations of cell-free DNA screening in detecting microdeletions and the possiblity of false positives and false negatives. Expanded NIPS also allows for reflex to include other microdeletion conditions and rare autosomal trisomies if an indication would arise later in the pregnancy. The patient opted into 22q11.2 deletion syndrome screening as part of the expanded NIPT option.     We discussed the following ultrasound options:    Nuchal translucency (NT) ultrasound  Ultrasound between [id number]-32e3x that includes nuchal translucency measurement and nasal bone assessments  Nuchal  translucency refers to the space at the back of the neck where fluid builds up. All babies at this stage have fluid and there is only concern if there is too much fluid  Nasal bone refers to the small bone in the nose. There is concern for conditions like Down syndrome if the bone cannot be seen at all  This ultrasound can be done as part of first trimester screening, at the same time as another screen (NIPT), at the same time as a CVS, or if the patients does not want genetic screening.  Markers on ultrasound detects about 70% of pregnancies with aneuploidy  Abnormalities on NT ultrasound can also increase the risk for a birth defect, like a heart defect    Comprehensive level II ultrasound (Fetal Anatomy Ultrasound)  Ultrasound done between 18-20 weeks gestation  Screens for major birth defects and markers for aneuploidy (like trisomy 21 and trisomy 18)  Includes looking at the fetus/baby's growth, heart, organs (stomach, kidneys), placenta, and amniotic fluid    We discussed the following diagnostic options:     Amniocentesis  Invasive diagnostic procedure done after 15 weeks gestation  The procedure collects a small sample of amniotic fluid for the purpose of chromosomal testing and/or other genetic testing  Diagnostic result; more than 99% sensitivity for fetal chromosome abnormalities  Testing for AFP in the amniotic fluid can test for open neural tube defects    It was a pleasure to be involved with [facility]. Face-to-face time of the meeting was 45 minutes.    Sandy Fitch GC, MS, [facility]  Board Certified and [place] Licensed Genetic Counselor   [facility]  Maternal Fetal Medicine  Office: [phone]  [facility]: [phone]   Fax: [phone]  [facility]

## 2024-01-26 LAB
HBV SURFACE AG SERPL QL IA: NONREACTIVE
HCV AB SERPL QL IA: NONREACTIVE
HIV 1+2 AB+HIV1 P24 AG SERPL QL IA: NONREACTIVE
RUBV IGG SERPL QL IA: 6.13 INDEX
RUBV IGG SERPL QL IA: POSITIVE
T PALLIDUM AB SER QL: NONREACTIVE

## 2024-01-27 LAB — BACTERIA UR CULT: NORMAL

## 2024-01-30 ENCOUNTER — TELEPHONE (OUTPATIENT)
Dept: MATERNAL FETAL MEDICINE | Facility: CLINIC | Age: 34
End: 2024-01-30
Payer: COMMERCIAL

## 2024-01-30 DIAGNOSIS — O99.012 ANEMIA DURING PREGNANCY IN SECOND TRIMESTER: Primary | ICD-10-CM

## 2024-01-30 LAB — SCANNED LAB RESULT: NORMAL

## 2024-01-30 RX ORDER — MULTIVIT WITH MINERALS/LUTEIN
250 TABLET ORAL DAILY
Qty: 90 TABLET | Refills: 0 | Status: SHIPPED | OUTPATIENT
Start: 2024-01-30 | End: 2024-04-29

## 2024-01-30 RX ORDER — CYANOCOBALAMIN (VITAMIN B-12) 500 MCG
1000 TABLET ORAL DAILY
Qty: 180 TABLET | Refills: 0 | Status: SHIPPED | OUTPATIENT
Start: 2024-01-30 | End: 2024-04-29

## 2024-01-30 NOTE — TELEPHONE ENCOUNTER
Called Polly and discussed their normal NIPT result.      The results are negative and consistent with two copies of chromosomes 21, 18, and 13.     Sex chromosome analysis was also included and the result is negative which is consistent with two copies of sex chromosomes. The sex chromosome results were XY. The predicted male sex was disclosed per patient's wishes.    The results are also negative and consistent with no microdeletion in 22q11.21 region.     This puts her current pregnancy at low risk for Down syndrome, trisomy 18, trisomy 13 and sex chromosome abnormalities. This test is reported to have a greater than 99% sensitivity for trisomy 21, trisomy 18, and trisomy 13, and monosomy X. Sensitivity data is not available for individual sex chromosome aneuploidies and 22q11.2 microdeletion (if included). Although these results are reassuring, this does not replace a standard chromosome analysis from a chorionic villus sampling or amniocentesis.     Fetal anatomy ultrasound is recommended. This ultrasound will likely be completed with the patient's primary OB provider.     MSAFP is the appropriate second trimester screening test for open neural tube defects; the maternal quad screen is not recommended.    Her results are available in her Epic chart for her primary OB provider to review.    Sandy Fitch MS, [facility]  Licensed Genetic Counselor   [facility]  Maternal Fetal Medicine  [email or web address]  [email or web address]  Office: [phone]  Pager [phone]  [facility]: [phone]   Fax: [phone]

## 2024-01-31 LAB
BKR LAB AP GYN ADEQUACY: ABNORMAL
BKR LAB AP GYN INTERPRETATION: ABNORMAL
BKR LAB AP HPV REFLEX: ABNORMAL
BKR LAB AP PREVIOUS ABNORMAL: ABNORMAL
PATH REPORT.COMMENTS IMP SPEC: ABNORMAL
PATH REPORT.COMMENTS IMP SPEC: ABNORMAL
PATH REPORT.RELEVANT HX SPEC: ABNORMAL

## 2024-02-01 LAB
HUMAN PAPILLOMA VIRUS 16 DNA: NEGATIVE
HUMAN PAPILLOMA VIRUS 18 DNA: NEGATIVE
HUMAN PAPILLOMA VIRUS FINAL DIAGNOSIS: NORMAL
HUMAN PAPILLOMA VIRUS OTHER HR: NEGATIVE

## 2024-02-02 ENCOUNTER — PATIENT OUTREACH (OUTPATIENT)
Dept: MIDWIFE SERVICES | Facility: CLINIC | Age: 34
End: 2024-02-02

## 2024-02-02 DIAGNOSIS — R87.613 HSIL ON PAP SMEAR OF CERVIX: Primary | ICD-10-CM

## 2024-02-15 ENCOUNTER — PRENATAL OFFICE VISIT (OUTPATIENT)
Dept: MIDWIFE SERVICES | Facility: CLINIC | Age: 34
End: 2024-02-15
Payer: COMMERCIAL

## 2024-02-15 VITALS
OXYGEN SATURATION: 100 % | DIASTOLIC BLOOD PRESSURE: 63 MMHG | WEIGHT: 135.8 LBS | SYSTOLIC BLOOD PRESSURE: 103 MMHG | BODY MASS INDEX: 21.92 KG/M2 | HEART RATE: 77 BPM | RESPIRATION RATE: 16 BRPM | TEMPERATURE: 98.2 F

## 2024-02-15 DIAGNOSIS — Z34.83 ENCOUNTER FOR SUPERVISION OF OTHER NORMAL PREGNANCY IN THIRD TRIMESTER: Primary | ICD-10-CM

## 2024-02-15 PROCEDURE — 87563 M. GENITALIUM AMP PROBE: CPT | Mod: 90 | Performed by: ADVANCED PRACTICE MIDWIFE

## 2024-02-15 PROCEDURE — 99000 SPECIMEN HANDLING OFFICE-LAB: CPT | Performed by: ADVANCED PRACTICE MIDWIFE

## 2024-02-15 PROCEDURE — 87798 DETECT AGENT NOS DNA AMP: CPT | Mod: 90 | Performed by: ADVANCED PRACTICE MIDWIFE

## 2024-02-15 PROCEDURE — 36415 COLL VENOUS BLD VENIPUNCTURE: CPT | Performed by: ADVANCED PRACTICE MIDWIFE

## 2024-02-15 PROCEDURE — 99207 PR PRENATAL VISIT: CPT | Performed by: ADVANCED PRACTICE MIDWIFE

## 2024-02-15 PROCEDURE — 82105 ALPHA-FETOPROTEIN SERUM: CPT | Mod: 90 | Performed by: ADVANCED PRACTICE MIDWIFE

## 2024-02-15 NOTE — PROGRESS NOTES
Here for PNV and need for AFP.  No other concerns   Will set up for next visit with US for fetal survey.  ASSESSMENT: 17w0d   PLAN: RTC 2-3 weeks for visit.

## 2024-02-17 LAB
# FETUSES US: NORMAL
AFP MOM SERPL: 0.89
AFP SERPL-MCNC: 41 NG/ML
AGE - REPORTED: 33.7 YR
CURRENT SMOKER: NO
FAMILY MEMBER DISEASES HX: NO
GA METHOD: NORMAL
GA: NORMAL WK
IDDM PATIENT QL: NO
INTEGRATED SCN PATIENT-IMP: NORMAL
SPECIMEN DRAWN SERPL: NORMAL

## 2024-02-18 NOTE — RESULT ENCOUNTER NOTE
Dear Polly,    Your test results are attached below. Great news. Your screening test for neural tube defects is negative.  If you have any questions, please contact me via [employer] or you can call our office at [phone].    Barbara Ross CNM, APRN RICH, CNM

## 2024-02-19 LAB
M GENITALIUM DNA SPEC QL NAA+PROBE: NOT DETECTED
M HOMINIS DNA SPEC QL NAA+PROBE: NOT DETECTED
U PARVUM DNA SPEC QL NAA+PROBE: ABNORMAL
U UREALYTICUM DNA SPEC QL NAA+PROBE: NOT DETECTED

## 2024-02-19 NOTE — RESULT ENCOUNTER NOTE
Dear Polly,    Your test results are attached below. Your test was negative for mycoplasma and ureaplasma. It was inconclusive for one type of ureaplasma which shows us that you do not have a clear infection with these organisms. If you continue to have symptoms please let us know and we can do an exam and possibly further testing.  If you have any questions, please contact me via [employer] or you can call our office at [phone].    Barbara Ross, RICH, RUBEN VILLANUEVA, CNM

## 2024-03-07 ENCOUNTER — PRENATAL OFFICE VISIT (OUTPATIENT)
Dept: MIDWIFE SERVICES | Facility: CLINIC | Age: 34
End: 2024-03-07
Attending: ADVANCED PRACTICE MIDWIFE
Payer: COMMERCIAL

## 2024-03-07 ENCOUNTER — ANCILLARY PROCEDURE (OUTPATIENT)
Dept: ULTRASOUND IMAGING | Facility: CLINIC | Age: 34
End: 2024-03-07
Attending: ADVANCED PRACTICE MIDWIFE
Payer: COMMERCIAL

## 2024-03-07 VITALS
WEIGHT: 140.9 LBS | DIASTOLIC BLOOD PRESSURE: 72 MMHG | BODY MASS INDEX: 22.74 KG/M2 | SYSTOLIC BLOOD PRESSURE: 116 MMHG | TEMPERATURE: 98 F | HEART RATE: 78 BPM | OXYGEN SATURATION: 100 %

## 2024-03-07 DIAGNOSIS — Z34.83 ENCOUNTER FOR SUPERVISION OF OTHER NORMAL PREGNANCY IN THIRD TRIMESTER: ICD-10-CM

## 2024-03-07 DIAGNOSIS — O26.892 HEARTBURN IN PREGNANCY IN SECOND TRIMESTER: ICD-10-CM

## 2024-03-07 DIAGNOSIS — R12 HEARTBURN IN PREGNANCY IN SECOND TRIMESTER: ICD-10-CM

## 2024-03-07 DIAGNOSIS — Z34.83 ENCOUNTER FOR SUPERVISION OF OTHER NORMAL PREGNANCY IN THIRD TRIMESTER: Primary | ICD-10-CM

## 2024-03-07 PROCEDURE — 99207 PR PRENATAL VISIT: CPT | Performed by: ADVANCED PRACTICE MIDWIFE

## 2024-03-07 PROCEDURE — 76805 OB US >/= 14 WKS SNGL FETUS: CPT | Performed by: OBSTETRICS & GYNECOLOGY

## 2024-03-07 NOTE — PROGRESS NOTES
20w0d  Fetal survey today measuring within 4 days of gestation by LMP; CINDY remains 7/25/2024. No fetal quickening yet. Reports some mild headaches that improve with water. Also endorses persistent nausea, and reflux. Educated on physiologic changes r/t blood volume and GI motility during pregnancy; encouraged to increase water intake between meals, eat small frequent meals, avoid spicy foods, and avoid laying horizontal for 1-2 hours after eating. Encouraged to try TUMS but patient reports she doesn't tolerate the taste of TUMS, Rx for omeprazole sent to preferred pharmacy. Patient reports taking PO iron, reviewed will recheck levels at 24 weeks with GCT. Discussed indication for colposcopy; encouraged to schedule at .    Denies water leaking, vaginal bleeding, decreased fetal movement, contraction pain, or other concerns. Danger signs and preeclampsia signs discussed. Encouraged to call triage with concerns and has phone numbers to call. RTC 4 weeks for GCT, Hgb, second trimester syphilis screen.    VARUN Horn    Attestation   I was present with the RICH student who participated in the service and in the documentation of the services provided. I have verified the history and personally performed the physical exam and medical decision making, as documented by the student and edited by me.  RUBEN Muniz, RICH

## 2024-04-04 ENCOUNTER — PRENATAL OFFICE VISIT (OUTPATIENT)
Dept: MIDWIFE SERVICES | Facility: CLINIC | Age: 34
End: 2024-04-04
Payer: COMMERCIAL

## 2024-04-04 VITALS
WEIGHT: 147.2 LBS | HEART RATE: 84 BPM | SYSTOLIC BLOOD PRESSURE: 108 MMHG | OXYGEN SATURATION: 100 % | BODY MASS INDEX: 23.76 KG/M2 | DIASTOLIC BLOOD PRESSURE: 65 MMHG

## 2024-04-04 DIAGNOSIS — Z34.82 ENCOUNTER FOR SUPERVISION OF OTHER NORMAL PREGNANCY, SECOND TRIMESTER: Primary | ICD-10-CM

## 2024-04-04 LAB
GLUCOSE 1H P 50 G GLC PO SERPL-MCNC: 75 MG/DL (ref 70–129)
HGB BLD-MCNC: 11.4 G/DL (ref 11.7–15.7)
T PALLIDUM AB SER QL: NONREACTIVE

## 2024-04-04 PROCEDURE — 36415 COLL VENOUS BLD VENIPUNCTURE: CPT | Performed by: ADVANCED PRACTICE MIDWIFE

## 2024-04-04 PROCEDURE — 82950 GLUCOSE TEST: CPT | Performed by: ADVANCED PRACTICE MIDWIFE

## 2024-04-04 PROCEDURE — 86780 TREPONEMA PALLIDUM: CPT | Performed by: ADVANCED PRACTICE MIDWIFE

## 2024-04-04 PROCEDURE — 99207 PR PRENATAL VISIT: CPT | Performed by: ADVANCED PRACTICE MIDWIFE

## 2024-04-04 NOTE — PROGRESS NOTES
24w0d  Polly is here with Jayy for prenatal visit. She is feeling well. Baby is active, no bleeding, leaking of fluid, headaches. She did not  her omeprazole. Heartburn is worst with eating chocolate. Denies constipation. Discussed how PPI works, and that if she decides to take it, she should take it every day to suppress acid production. Doing GCT/hgb/trep today. Next visit in 4 weeks.  Ad Eckert CNM

## 2024-04-15 ENCOUNTER — OFFICE VISIT (OUTPATIENT)
Dept: OBGYN | Facility: CLINIC | Age: 34
End: 2024-04-15
Payer: COMMERCIAL

## 2024-04-15 VITALS
OXYGEN SATURATION: 100 % | BODY MASS INDEX: 23.73 KG/M2 | DIASTOLIC BLOOD PRESSURE: 67 MMHG | HEART RATE: 91 BPM | SYSTOLIC BLOOD PRESSURE: 108 MMHG | WEIGHT: 147 LBS

## 2024-04-15 DIAGNOSIS — K40.90 UNILATERAL INGUINAL HERNIA WITHOUT OBSTRUCTION OR GANGRENE, RECURRENCE NOT SPECIFIED: ICD-10-CM

## 2024-04-15 DIAGNOSIS — R87.610 ATYPICAL SQUAMOUS CELL CHANGES OF UNDETERMINED SIGNIFICANCE (ASCUS) ON CERVICAL CYTOLOGY WITH NEGATIVE HIGH RISK HUMAN PAPILLOMA VIRUS (HPV) TEST RESULT: Primary | ICD-10-CM

## 2024-04-15 PROCEDURE — 57452 EXAM OF CERVIX W/SCOPE: CPT | Performed by: OBSTETRICS & GYNECOLOGY

## 2024-04-15 NOTE — PROGRESS NOTES
Polly Kent is a 33 year old year old female  who presents for initial colposcopy, referred. Pap smear on 2024 showed: ASCUS HPV negative.     Had pap in 2012 HSIL, no treatment but delivered a baby after that. Has spot in left groin to look at today, larger and not sure if lymph node or something else??    Patient's last menstrual period was 10/19/2023.  UPT today is not done  Patient does smoke Marijuana  Type of contraception: none--pregnant  Age at first sexual intercourse:   Number of sexual partners (lifetime):   Past GYN history:   Prior cervical/vaginal disease: VICKIE 3.  Prior cervical treatment: no treatment.      PROCEDURE:      Before the procedure, it was ensured that the patient was educated regarding the nature of her findings to date, the implications, and what was to be done. She has been made aware of the role of HPV, the natural history of infection, ways to minimize her future risk, the effect of HPV on the cervix, and treatment options available should they be indicated. The details of the colposcopic procedure were reviewed. All questions were answered before proceeding, and informed consent was therefore obtained.      Speculum placed in vagina and excellent visualization of cervix acheived, cervix swabbed x 3 with acetic acid solution.      FINDINGS:  Cervix: acetowhitening noted 6 o'clock  Please refer to images section for details.  SCJ seen?: no   ECC done?: No  Satisfactory examination?: yes    Left groin with bulging felt jenna with standing position and about 1.5 cm defect in inguinal canal. No lymph nodes are enlarged.       ASSESSMENT: Normal exam without visible pathology.  Left inguinal hernia    PLAN: plan repeat pap smear one year with HPV. Discussed importance of follow-up when she is NOT pregnant. Abnormal pap was >10 yrs ago.     Discussed her hernia and what signs would require ER visit. Reviewed outpatient surgical referral after pregnancy for repair. Answered  questions.    Sidra Thurman MD

## 2024-04-15 NOTE — PATIENT INSTRUCTIONS

## 2024-04-16 PROBLEM — K40.90 UNILATERAL INGUINAL HERNIA WITHOUT OBSTRUCTION OR GANGRENE, RECURRENCE NOT SPECIFIED: Status: ACTIVE | Noted: 2024-04-16

## 2024-04-29 ENCOUNTER — PATIENT OUTREACH (OUTPATIENT)
Dept: MIDWIFE SERVICES | Facility: CLINIC | Age: 34
End: 2024-04-29
Payer: COMMERCIAL

## 2024-04-29 DIAGNOSIS — R87.613 HSIL ON PAP SMEAR OF CERVIX: Primary | ICD-10-CM

## 2024-04-29 NOTE — TELEPHONE ENCOUNTER
4/15/24 Mason: ASSESSMENT: Normal exam without visible pathology. PLAN: plan repeat pap smear one year with HPV. Discussed importance of follow-up when she is NOT pregnant. Abnormal pap was >10 yrs ago.

## 2024-04-30 ENCOUNTER — PRENATAL OFFICE VISIT (OUTPATIENT)
Dept: MIDWIFE SERVICES | Facility: CLINIC | Age: 34
End: 2024-04-30
Payer: COMMERCIAL

## 2024-04-30 VITALS
SYSTOLIC BLOOD PRESSURE: 104 MMHG | WEIGHT: 151.2 LBS | BODY MASS INDEX: 24.4 KG/M2 | OXYGEN SATURATION: 100 % | DIASTOLIC BLOOD PRESSURE: 67 MMHG | HEART RATE: 96 BPM

## 2024-04-30 DIAGNOSIS — Z34.82 ENCOUNTER FOR SUPERVISION OF OTHER NORMAL PREGNANCY IN SECOND TRIMESTER: Primary | ICD-10-CM

## 2024-04-30 PROCEDURE — 99207 PR PRENATAL VISIT: CPT | Performed by: ADVANCED PRACTICE MIDWIFE

## 2024-04-30 NOTE — PROGRESS NOTES
27w5d  Polly is here with Jayy today. Feeling well. Reports good fetal movement. Denies leaking of fluid, vaginal bleeding, regular uterine contractions, headache, visual changes, or other concerns. Discussed upcoming TDAP. Jayy is up to date with TDAP with recently physical. RTC in 2 weeks.    RUBEN Sidhu CNM

## 2024-05-17 ENCOUNTER — PRENATAL OFFICE VISIT (OUTPATIENT)
Dept: MIDWIFE SERVICES | Facility: CLINIC | Age: 34
End: 2024-05-17
Payer: COMMERCIAL

## 2024-05-17 VITALS
DIASTOLIC BLOOD PRESSURE: 69 MMHG | HEART RATE: 90 BPM | OXYGEN SATURATION: 96 % | BODY MASS INDEX: 24.05 KG/M2 | WEIGHT: 149 LBS | SYSTOLIC BLOOD PRESSURE: 104 MMHG

## 2024-05-17 DIAGNOSIS — K40.91 UNILATERAL RECURRENT INGUINAL HERNIA WITHOUT OBSTRUCTION OR GANGRENE: Primary | ICD-10-CM

## 2024-05-17 PROCEDURE — 90715 TDAP VACCINE 7 YRS/> IM: CPT | Performed by: ADVANCED PRACTICE MIDWIFE

## 2024-05-17 PROCEDURE — 90471 IMMUNIZATION ADMIN: CPT | Performed by: ADVANCED PRACTICE MIDWIFE

## 2024-05-17 PROCEDURE — 99207 PR PRENATAL VISIT: CPT | Performed by: ADVANCED PRACTICE MIDWIFE

## 2024-05-17 NOTE — PROGRESS NOTES
30w1d  Polly is here with Jayy today. Reports good fetal movement. Denies leaking of fluid, vaginal bleeding, regular uterine contractions, headache, visual changes, or other concerns. Feels like inguinal hernia has been more uncomfortable and exaccerbated as pregnancy progresses. Discussed hot packs, Tylenol if needed, and recommended pregnancy support belt. Fitted for one today. Accepts TDAP today. RTC in 2 weeks.    RUBEN Sidhu CNM

## 2024-05-30 ENCOUNTER — PRENATAL OFFICE VISIT (OUTPATIENT)
Dept: MIDWIFE SERVICES | Facility: CLINIC | Age: 34
End: 2024-05-30
Payer: COMMERCIAL

## 2024-05-30 VITALS
SYSTOLIC BLOOD PRESSURE: 112 MMHG | RESPIRATION RATE: 16 BRPM | OXYGEN SATURATION: 100 % | HEART RATE: 94 BPM | WEIGHT: 149.9 LBS | DIASTOLIC BLOOD PRESSURE: 67 MMHG | BODY MASS INDEX: 24.19 KG/M2 | TEMPERATURE: 97.9 F

## 2024-05-30 DIAGNOSIS — Z34.83 ENCOUNTER FOR SUPERVISION OF OTHER NORMAL PREGNANCY IN THIRD TRIMESTER: Primary | ICD-10-CM

## 2024-05-30 PROCEDURE — 99207 PR PRENATAL VISIT: CPT | Performed by: ADVANCED PRACTICE MIDWIFE

## 2024-05-30 NOTE — PROGRESS NOTES
32w0d lo Matias is doing well, here with partner Jayy. Reports intermittent BHCs, nothing regular. Denies bleeding, loss of fluid, or decreased fetal movement. Does report pressure in epigastric area with sitting, resolves with position change. Discussed likely normal d/t baby's position, but did review preeclampsia precautions.  Discussed weight gain her per request - over 24lb twg with minimal weight gain since 28wks. States this is normal, especially as she isn't reporting issues with nausea or vomiting that are preventing her from eating, and we are more concerned with weight trends over time. PTL precautions reviewed and discussed how to contact CNMs 24/7    RTC 2wks  RUBEN Birmingham, RICH

## 2024-06-01 ENCOUNTER — NURSE TRIAGE (OUTPATIENT)
Dept: NURSING | Facility: CLINIC | Age: 34
End: 2024-06-01
Payer: COMMERCIAL

## 2024-06-01 ENCOUNTER — HOSPITAL ENCOUNTER (OUTPATIENT)
Facility: CLINIC | Age: 34
Discharge: HOME | End: 2024-06-01
Attending: ADVANCED PRACTICE MIDWIFE | Admitting: ADVANCED PRACTICE MIDWIFE
Payer: COMMERCIAL

## 2024-06-01 ENCOUNTER — HOSPITAL ENCOUNTER (EMERGENCY)
Facility: CLINIC | Age: 34
End: 2024-06-01
Payer: COMMERCIAL

## 2024-06-01 VITALS — RESPIRATION RATE: 16 BRPM | DIASTOLIC BLOOD PRESSURE: 65 MMHG | TEMPERATURE: 98.6 F | SYSTOLIC BLOOD PRESSURE: 102 MMHG

## 2024-06-01 PROCEDURE — 59025 FETAL NON-STRESS TEST: CPT | Mod: 26 | Performed by: ADVANCED PRACTICE MIDWIFE

## 2024-06-01 PROCEDURE — 99213 OFFICE O/P EST LOW 20 MIN: CPT | Mod: 25 | Performed by: ADVANCED PRACTICE MIDWIFE

## 2024-06-01 PROCEDURE — G0463 HOSPITAL OUTPT CLINIC VISIT: HCPCS | Mod: 25

## 2024-06-01 PROCEDURE — 59025 FETAL NON-STRESS TEST: CPT

## 2024-06-01 PROCEDURE — 96374 THER/PROPH/DIAG INJ IV PUSH: CPT

## 2024-06-01 PROCEDURE — 999N000105 HC STATISTIC NO DOCUMENTATION TO SUPPORT CHARGE

## 2024-06-01 PROCEDURE — 258N000003 HC RX IP 258 OP 636: Performed by: ADVANCED PRACTICE MIDWIFE

## 2024-06-01 PROCEDURE — 96361 HYDRATE IV INFUSION ADD-ON: CPT

## 2024-06-01 PROCEDURE — 250N000011 HC RX IP 250 OP 636: Performed by: ADVANCED PRACTICE MIDWIFE

## 2024-06-01 RX ORDER — ONDANSETRON 2 MG/ML
4 INJECTION INTRAMUSCULAR; INTRAVENOUS EVERY 6 HOURS PRN
Status: DISCONTINUED | OUTPATIENT
Start: 2024-06-01 | End: 2024-06-01 | Stop reason: HOSPADM

## 2024-06-01 RX ORDER — DEXTROSE, SODIUM CHLORIDE, SODIUM LACTATE, POTASSIUM CHLORIDE, AND CALCIUM CHLORIDE 5; .6; .31; .03; .02 G/100ML; G/100ML; G/100ML; G/100ML; G/100ML
500 INJECTION, SOLUTION INTRAVENOUS ONCE
Status: COMPLETED | OUTPATIENT
Start: 2024-06-01 | End: 2024-06-01

## 2024-06-01 RX ADMIN — ONDANSETRON 4 MG: 2 INJECTION INTRAMUSCULAR; INTRAVENOUS at 20:08

## 2024-06-01 RX ADMIN — SODIUM CHLORIDE, POTASSIUM CHLORIDE, SODIUM LACTATE AND CALCIUM CHLORIDE 500 ML: 600; 310; 30; 20 INJECTION, SOLUTION INTRAVENOUS at 20:12

## 2024-06-01 RX ADMIN — SODIUM CHLORIDE, SODIUM LACTATE, POTASSIUM CHLORIDE, CALCIUM CHLORIDE AND DEXTROSE MONOHYDRATE 500 ML: 5; 600; 310; 30; 20 INJECTION, SOLUTION INTRAVENOUS at 20:45

## 2024-06-01 ASSESSMENT — ACTIVITIES OF DAILY LIVING (ADL)
ADLS_ACUITY_SCORE: 18
ADLS_ACUITY_SCORE: 35

## 2024-06-01 NOTE — TELEPHONE ENCOUNTER
OB Triage Call      Is patient's OB/Midwife with the [facility] or [facility]? [facility]- Proceed with triage     Reason for call: Vomiting in pregnancy     Assessment: Pt states that she hasn't been vomiting throughout pregnancy, but this morning did have an episode around 4am, and since then has vomited a total or 4 times today. Denies cough, fever, injury or continuous nausea. States whenever she trys to eat or when she drinks too much she vomits.     Plan: Gave care advise and callback instructions. Educated on clear liquid and diet progression as well as reasons to go into L&D/ED for rehydration       Patient's primary OB Provider is Emanuel ([facility]).      Per protocol recommendations Patient to follow home care recommendations.         32w2d    Estimated Date of Delivery: 2024        OB History    Para Term  AB Living   12 3 3 0 8 3   SAB IAB Ectopic Multiple Live Births   4 4 0 0 3      # Outcome Date GA Lbr Asa/2nd Weight Sex Type Anes PTL Lv   12 Current            11 Term 10/30/19 41w1d  3.6 kg (7 lb 15 oz) F Vag-Spont   CAMPBELL      Name: BG MANISHA CARREON      Apgar1: 9  Apgar5: 9   10 IAB 19           9 IAB 17           8 IAB 16           7 IAB 01/01/15           6 SAB 14           5 SAB 13           4 Term 12 39w0d 05:35 / 00:27 2.58 kg (5 lb 11 oz) M Vag-Spont EPI N CAMPBELL      Name: MARIANA CARREON MANISHA      Apgar1: 9  Apgar5: 9   3 SAB 11           2 SAB 11              Birth Comments: No D&C   1 Term 08/25/10 40w3d  3.544 kg (7 lb 13 oz) F  EPI N CAMPBELL      Birth Comments: hx of 1 min shoulder dystocia      Apgar1: 6  Apgar5: 9       Lab Results   Component Value Date    GBS  2012     Negative: No GBS DNA detected, presumed negative for GBS or number of bacteria   may be below the limit of detection of the assay.   Assay performed on incubated broth culture of specimen using alooma(R) real-time PCR.       "Manoj Daly RN   Reason for Disposition   MILD-MODERATE vomiting (e.g., 1-5 times / day) or nausea    Additional Information   Negative: Sounds like a life-threatening emergency to the triager   Negative: [1] Vomiting AND [2] contains red blood or black (\"coffee ground\") material  (Exception: Few red streaks in vomit that only happened once.)   Negative: [1] Insulin-dependent diabetes (Type I) AND [2] glucose > 400 mg/dl (22 mmol/l)   Negative: Recent head injury (within last 3 days)   Negative: Recent abdominal injury (within last 3 days)   Negative: Severe pain in one eye   Negative: [1] SEVERE vomiting (e.g., 6 or more times / day) AND [2] present > 8 hours   Negative: [1] Drinking very little AND [2] dehydration suspected (e.g., no urine > 12 hours, very dry mouth, very lightheaded)   Negative: Patient sounds very sick or weak to the triager   Negative: [1] Unable to keep ANY liquids down (without vomiting) AND [2] present > 24 hours   Negative: Weight loss > 5 pounds (2.5 kg) in last 2 weeks   Negative: Vomiting an essential or prescribed medication  (Exception: Taking prenatal vitamins.)   Negative: Recently started on a new medication   Negative: [1] MODERATE vomiting (e.g., 3 - 5 times / day) AND [2] present > 3 days   Negative: Pain or burning with passing urine (urination)   Negative: Substance use (drug use) or unhealthy alcohol use, known or suspected   Negative: High-risk adult (e.g., diabetes mellitus, AIDS/HIV)   Negative: [1] MILD vomiting (e.g., 1 - 2 times / day) AND [2] present > 3 days AND [3] started after the 9th week of pregnancy   Negative: [1] MILD vomiting AND [2] present > 1 week AND [3] no improvement after using Morning Sickness Care Advice    Protocols used: Pregnancy - Morning Sickness (Nausea and Vomiting of Pregnancy)-A-    "

## 2024-06-02 NOTE — H&P
Polly Kent is a 33 year old female,       Patient's last menstrual period was 10/19/2023.. Estimated Date of Delivery: 2024 is calculated from lmp and verified with U/S    [facility] for evaluation of nausea and vomiting    HPI Pt states she has been unable to keep anything down, fluids or a cutie orange since 4 am.  States she has thrown up at least 7 times today.  Reports very little voiding.  Denies BM today which is unusual for her.   She has felt a little tired and fatigued.  Denies fever, diarrhea, URI symptoms.  No one else in her household has been ill.    PRENATAL COURSE  Prenatal care began at 14 wks gestation for a total of 7 prenatal visits.  Total wt gain 24 lbs   Prenatal course was complicated by    Patient Active Problem List    Diagnosis Date Noted    Unilateral inguinal hernia without obstruction or gangrene, recurrence not specified 2024     Priority: Medium    History of multiple miscarriages 2024     Priority: Medium    Need for Tdap vaccination 2024     Priority: Medium    Injury of quadriceps muscle 2022     Priority: Medium    History of anemia 2021     Priority: Medium    Menorrhagia with irregular cycle 2021     Priority: Medium    Crush injury, leg, lower, left, initial encounter 2017     Priority: Medium    Bacterial vaginosis 2012     Priority: Medium     12: Currently treating for BV, diagnosed at [facility] ER visit.      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 VICKIE 2/3- [place] not done  Referred to Gyn Onc for VICKIE 2/3  12- Gyn Oncology consult- colp done, no HPV testing, no biopsies.              Plan: repeat colp in 8-10 wks, scheduled for 10/2/12, reminder sent. If stable, Pap and colp postpartum  10/2/12- Oncology consult- colp unchanged, FU with onc 6 weeks postpartum  13: No showed x 3 with gyn onc, x 2 with us. Lost to pap  tracking  1/25/24 ASCUS, Neg HPV, pregnant. Plan [place] bef 4/25/24 / notified  4/15/24 [place]: ASSESSMENT: Normal exam without visible pathology. PLAN: plan repeat pap smear one year with HPV. Discussed importance of follow-up when she is NOT pregnant. Abnormal pap was >10 yrs ago.       History of shoulder dystocia in prior pregnancy 10/11/2011     Priority: Medium     8/25/10, noted 1 minute dystocia, baby delivered with Joelle and suprapubic pressure         HISTORIES  Allergies   Allergen Reactions    Nkda [No Known Drug Allergy]      Past Medical History:   Diagnosis Date    Abnormal Pap smear of cervix     Anemia     hx with previous child, none now    Carrier of group B Streptococcus     HSIL on Pap smear 06/01/2012    colp:VICKIE II & III referred to gyn/onc    Infertility, female     Varicella      Past Surgical History:   Procedure Laterality Date    FOOT SURGERY Left 2017    see CareEverywhere notes from [facility]     Family History   Problem Relation Age of Onset    Family History Negative Mother     No Known Problems Father     Cancer Paternal Grandmother         leukemia     Social History     Tobacco Use    Smoking status: Former     Types: Cigarettes     Passive exposure: Never    Smokeless tobacco: Never   Substance Use Topics    Alcohol use: No       LABS:    Blood type O positive   Rhogam not indicated  Rubella immune   Treponema Pallidum Antibody  Negative    HIV    Non-reactive   Hepatitis B Non-reactive       ROS  C: NEGATIVE for fever, chills, change in weight  I: NEGATIVE for worrisome rashes, moles or lesions  E/M: NEGATIVE for ear, mouth and throat problems  R: NEGATIVE for significant cough or SOB  CV: NEGATIVE for chest pain, palpitations or peripheral edema  GI: NEGATIVE for nausea, abdominal pain, heartburn, or change in bowel habits  : NEGATIVE for frequency, dysuria, or hematuria  PSYCHIATRIC:  NEGATIVE for depression, anxiety or other mental health concerns      PHYSICAL EXAM:  /65    Temp 98.6  F (37  C) (Oral)   Resp 16   LMP 10/19/2023   General appearance healthy, alert, active, and no distress  Neuro:  denies headache and visual disturbances  Psych: Mentation normal and bright   Legs: 2+/2+, no clonus, no edema       Abdomen: gravid, single fetus, non-tender. Pt is not ingrid    FETAL HEART TONES: baseline 130 with moderate FHR variability and accelerations.  No decelerations present.     MEMBRANES: Membranes are intact    PELVIC EXAM: deferred  BLOODY SHOW:: no    ASSESSMENT:  IUP @ 32w2d  wks gestation in for observation for nausea and vomiting   NST  reactive        PLAN:  IV fluid bolus, 500 ml LR and 500 ml D5LR.  (No hx of diabetes, GCT 75)   4 mg zofran IVP  Attempt oral fluids and crackers here, once tolerating PO anticipate discharge home undelivered   RTC as scheduled (6/13/2024)     Nerissa Lowery CNM

## 2024-06-02 NOTE — PROVIDER NOTIFICATION
06/01/24 1938   Provider Notification   Provider Name/Title Nerissa Lowery   Method of Notification Phone   Request Evaluate in Person   Notification Reason Patient Arrived     RN notified provider that patient had arrived with N/V

## 2024-06-12 ENCOUNTER — TELEPHONE (OUTPATIENT)
Dept: MIDWIFE SERVICES | Facility: CLINIC | Age: 34
End: 2024-06-12
Payer: COMMERCIAL

## 2024-06-12 ENCOUNTER — MYC MEDICAL ADVICE (OUTPATIENT)
Dept: MIDWIFE SERVICES | Facility: CLINIC | Age: 34
End: 2024-06-12
Payer: COMMERCIAL

## 2024-06-12 DIAGNOSIS — O21.9 NAUSEA AND VOMITING IN PREGNANCY: Primary | ICD-10-CM

## 2024-06-12 RX ORDER — ONDANSETRON 4 MG/1
4 TABLET, ORALLY DISINTEGRATING ORAL EVERY 6 HOURS PRN
Qty: 30 TABLET | Refills: 0 | Status: ON HOLD | OUTPATIENT
Start: 2024-06-12 | End: 2024-07-29

## 2024-06-12 RX ORDER — FAMOTIDINE 20 MG/1
20 TABLET, FILM COATED ORAL 2 TIMES DAILY
Qty: 30 TABLET | Refills: 0 | Status: SHIPPED | OUTPATIENT
Start: 2024-06-12

## 2024-06-12 NOTE — TELEPHONE ENCOUNTER
33w6d    Call with concerns of vomiting.   States that over the last two weeks, has been sick one day and then fine the next, then sick again, etc.  Vomit this morning is yellow. Has some bright red pieces in it. Has not eaten anything yet today. Is drinking water but vomiting some of it back up.  Throat does not feel torn up  Denies other symptoms like dizziness, lightheadedness, cramping, no one around her is sick  Reports positive fetal movement  Called with similar complaints on 6/3, took some zofran from a relative and that helped her symptoms and she was able to rest.  Did go to L&D triage on  with similar complaints and received fluids, felt better after that.       Able to eat some the days she isn't vomiting, water intake is good.    Patient feels comfortable at home now while RN reaches out to CNM on call.     Next prenatal apt is tomorrow .  Rx an antiemetic?   Triage for further eval/fluids?       Routing to [facility].  CINDY Rich

## 2024-06-12 NOTE — CONFIDENTIAL NOTE
Rx for Zofran signed. She can try pepcid also if she feels like heartburn is contributing to her vomiting. Okay for IV fluids today if needed. Okay to also set up outpatient IV infusions if she desires. Thanks, RUBEN Gifford CNM

## 2024-06-12 NOTE — TELEPHONE ENCOUNTER
CNM stated pepcid, zofran are okay. IV fluids in triage if pts wants, can also set up outptn IV fluids.    Called Polly, let her know zofran rx was sent to the pharmacy and she will try that. Also stated that she is experiencing a lot of heartburn so pepcid rx sent as well. Let her know IV fluids are an option, she will let us know if she feels like she needs them.    CINDY Rich

## 2024-06-12 NOTE — TELEPHONE ENCOUNTER
Caller reporting the following red-flag symptom(s): throwing up blood- 33 weeks 6 days GA    Per the system red-flag symptom policy, patient was instructed to:  speak with a Registered Nurse    Action:  Patient warm transferred to a Registered Nurse

## 2024-06-13 ENCOUNTER — PRENATAL OFFICE VISIT (OUTPATIENT)
Dept: MIDWIFE SERVICES | Facility: CLINIC | Age: 34
End: 2024-06-13
Payer: COMMERCIAL

## 2024-06-13 VITALS
WEIGHT: 145.4 LBS | DIASTOLIC BLOOD PRESSURE: 62 MMHG | BODY MASS INDEX: 23.47 KG/M2 | SYSTOLIC BLOOD PRESSURE: 101 MMHG | HEART RATE: 79 BPM | TEMPERATURE: 98.1 F | OXYGEN SATURATION: 100 %

## 2024-06-13 DIAGNOSIS — Z34.83 ENCOUNTER FOR SUPERVISION OF OTHER NORMAL PREGNANCY IN THIRD TRIMESTER: Primary | ICD-10-CM

## 2024-06-13 DIAGNOSIS — O26.893 HEARTBURN DURING PREGNANCY IN THIRD TRIMESTER: ICD-10-CM

## 2024-06-13 DIAGNOSIS — R12 HEARTBURN DURING PREGNANCY IN THIRD TRIMESTER: ICD-10-CM

## 2024-06-13 PROCEDURE — 99207 PR PRENATAL VISIT: CPT | Performed by: ADVANCED PRACTICE MIDWIFE

## 2024-06-13 NOTE — PATIENT INSTRUCTIONS
Weeks 34 to 36 of Your Pregnancy: Care Instructions  Your belly has grown quite large. It's almost time to give birth! Your baby's lungs are almost ready to breathe air. The skull bones are firm enough to protect your baby's head. But they're soft enough to move down through the birth canal.    You might be wondering what to expect during labor. Because each birth is different, there's no way to know exactly what childbirth will be like for you. Talk to your doctor or midwife about any concerns you have.    You'll probably have a test for group B streptococcus (GBS). GBS is bacteria that can live in the vagina and rectum. GBS can make your baby sick after birth. If you test positive, you'll get antibiotics during labor.    Choose what type of pain relief you would like during labor.  You can choose from a few types, including medicine and non-medicine options. You may want to use several types of pain relief.     Know how medicines can help with pain during labor.  Some medicines lower anxiety and help with some of the pain. Others make your lower body numb so that you will feel less pain.     Tell your doctor about your pain medicine choice.  Do this before you start labor or very early in your labor. You may be able to change your mind during labor.     Learn about the stages of labor.    The first stage includes the early (latent) and active phases of labor. Contractions start in early labor. During active labor, contractions get stronger, last longer, and happen more often. And the cervix opens more rapidly.  The second stage starts when you're ready to push. During this stage, your baby is born.  During the third stage, you'll usually have a few more contractions to push out the placenta.   Follow-up care is a key part of your treatment and safety. Be sure to make and go to all appointments, and call your doctor if you are having problems. It's also a good idea to know your test results and keep a list of the  "medicines you take.  Where can you learn more?  Go to https://www.ShopIgniter.net/patiented  Enter B912 in the search box to learn more about \"Weeks 34 to 36 of Your Pregnancy: Care Instructions.\"  Current as of: July 10, 2023               Content Version: 14.0    9757-7267 [employer].   Care instructions adapted under license by your healthcare professional. If you have questions about a medical condition or this instruction, always ask your healthcare professional. [employer] disclaims any warranty or liability for your use of this information.      Group B Strep During Pregnancy: Care Instructions  Overview     Group B strep infection is caused by a type of bacteria. It's a different kind of bacteria than the kind that causes strep throat.  You may have this kind of bacteria in your body. Sometimes it may cause an infection, but most of the time it doesn't make you sick or cause symptoms. But if you pass the bacteria to your baby during the birth, it can cause serious health problems for your baby.  If you have this bacteria in your body, you will get antibiotics when you are in labor. Antibiotics help prevent problems for a  baby.  After birth, doctors will watch and may test your baby. If your baby tests positive for Group B strep, your baby will get antibiotics.  If you plan to breastfeed your baby, don't worry. It will be safe to breastfeed.  Follow-up care is a key part of your treatment and safety. Be sure to make and go to all appointments, and call your doctor if you are having problems. It's also a good idea to know your test results and keep a list of the medicines you take.  How can you care for yourself at home?  If your doctor has prescribed antibiotics, take them as directed. Do not stop taking them just because you feel better. You need to take the full course of antibiotics.  Tell your doctor if you are allergic to any antibiotic.  If you go into labor, or your " "water breaks, go to the hospital. Your doctor will give you antibiotics to help protect your baby from infection.  Tell the doctors and nurses if you have an allergy to penicillin.  Tell the doctors and nurses at the hospital that you tested positive for group B strep.  When should you call for help?   Call your doctor now or seek immediate medical care if:    You have symptoms of a urinary tract infection. These may include:  Pain or burning when you urinate.  A frequent need to urinate without being able to pass much urine.  Pain in the flank, which is just below the rib cage and above the waist on either side of the back.  Blood in your urine.  A fever.     You think you are in labor or your water has broken.     You have pain in your belly or pelvis.   Watch closely for changes in your health, and be sure to contact your doctor if you have any problems.  Where can you learn more?  Go to https://www.ActiveSec.Glocal/patiented  Enter M001 in the search box to learn more about \"Group B Strep During Pregnancy: Care Instructions.\"  Current as of: June 12, 2023               Content Version: 14.0    4995-3781 [employer].   Care instructions adapted under license by your healthcare professional. If you have questions about a medical condition or this instruction, always ask your healthcare professional. [employer] disclaims any warranty or liability for your use of this information.      Circumcision in Infants: What to Expect at Home  Your Child's Recovery  After circumcision, your baby's penis may look red and swollen. It may have petroleum jelly and gauze on it. The gauze will likely come off when your baby urinates. Follow your doctor's directions about whether to put clean gauze back on your baby's penis or to leave the gauze off. If you need to remove gauze from the penis, use warm water to soak the gauze and gently loosen it.  The doctor may have used a Plastibell device to do the " circumcision. If so, your baby will have a plastic ring around the head of the penis. The ring should fall off by itself in 10 to 12 days.  A thin, yellow film may form over the area the day after the procedure. This is part of the normal healing process. It should go away in a few days.  Your baby may seem fussy while the area heals. It may hurt for your baby to urinate. This pain often gets better in 3 or 4 days. But it may last for up to 2 weeks.  Even though your baby's penis will likely start to feel better after 3 or 4 days, it may look worse. The penis often starts to look like it's getting better after about 7 to 10 days.  This care sheet gives you a general idea about how long it will take for your child to recover. But each child recovers at a different pace. Follow the steps below to help your child get better as quickly as possible.  How can you care for your child at home?  Activity    Let your baby rest as much as possible. Sleeping will help with recovery.     You can give your baby a sponge bath the day after surgery. Ask your doctor when it is okay to give your baby a bath.   Medicines    Your doctor will tell you if and when your child can restart any medicines. The doctor will also give you instructions about your child taking any new medicines.     Your doctor may recommend giving your baby acetaminophen (Tylenol) to help with pain after the procedure. Be safe with medicines. Give your child medicines exactly as prescribed. Call your doctor if you think your child is having a problem with a medicine.     Do not give your child two or more pain medicines at the same time unless the doctor told you to. Many pain medicines have acetaminophen, which is Tylenol. Too much acetaminophen (Tylenol) can be harmful.   Circumcision care    Always wash your hands before and after touching the circumcision area.     Gently wash your baby's penis with plain, warm water after each diaper change, and pat it dry.  "Do not use soap. Don't use hydrogen peroxide or alcohol. They can slow healing.     Do not try to remove the film that forms on the penis. The film will go away on its own.     Put plenty of petroleum jelly (such as Vaseline) on the circumcision area during each diaper change. This will prevent your baby's penis from sticking to the diaper while it heals.     Fasten your baby's diapers loosely so that there is less pressure on the penis while it heals.   Follow-up care is a key part of your child's treatment and safety. Be sure to make and go to all appointments, and call your doctor if your child is having problems. It's also a good idea to know your child's test results and keep a list of the medicines your child takes.  When should you call for help?   Call your doctor now or seek immediate medical care if:    Your baby has a fever over 100.4 F.     Your baby is extremely fussy or irritable, has a high-pitched cry, or refuses to eat.     Your baby does not have a wet diaper within 12 hours after the circumcision.     You find a spot of bleeding larger than a 2-inch [place] from the incision.     Your baby has signs of infection. Signs may include severe swelling; redness; a red streak on the shaft of the penis; or a thick, yellow discharge.   Watch closely for changes in your child's health, and be sure to contact your doctor if:    A Plastibell device was used for the circumcision and the ring has not fallen off after 10 to 12 days.   Where can you learn more?  Go to https://www.Cozi.net/patiented  Enter S255 in the search box to learn more about \"Circumcision in Infants: What to Expect at Home.\"  Current as of: October 24, 2023               Content Version: 14.0    1566-2273 [employer].   Care instructions adapted under license by your healthcare professional. If you have questions about a medical condition or this instruction, always ask your healthcare professional. [employer] " Incorporated disclaims any warranty or liability for your use of this information.

## 2024-06-13 NOTE — PROGRESS NOTES
34w0d  Feeling okay today. Has had intermittent nausea, vomiting, and heartburn throughout the last week and a half. Yesterday she endorses vomiting with bright red blood spotting. Now resolved. She is drinking a lot of fluid and able to keep that down as well as small meals, but doesn't have much of an appetite and is focusing on bland foods. She started taking some Zofran at home and that helps. Feels like the heartburn is really contributing to nausea. Discussed okay to try Pepcid and then if that is not helping with heartburn, can discontinue and try omeprazole PO 20mg daily. Reports good fetal movement. Denies leaking of fluid, vaginal bleeding, regular uterine contractions, headache, visual changes, or other concerns. Discussed s/sx of dehydration (dark urine, unable to keep down fluids) and plan to come to triage for IV hydration if needed. No other questions today. RTC in 2 weeks for BSUS, GBS, Hgb.    RUBEN Sidhu CNM

## 2024-06-27 ENCOUNTER — PRENATAL OFFICE VISIT (OUTPATIENT)
Dept: MIDWIFE SERVICES | Facility: CLINIC | Age: 34
End: 2024-06-27
Payer: COMMERCIAL

## 2024-06-27 VITALS
BODY MASS INDEX: 24.07 KG/M2 | OXYGEN SATURATION: 100 % | SYSTOLIC BLOOD PRESSURE: 99 MMHG | WEIGHT: 149.1 LBS | HEART RATE: 70 BPM | DIASTOLIC BLOOD PRESSURE: 59 MMHG

## 2024-06-27 DIAGNOSIS — O99.019 ANEMIA IN PREGNANCY: Primary | ICD-10-CM

## 2024-06-27 DIAGNOSIS — O99.019 ANEMIA IN PREGNANCY: ICD-10-CM

## 2024-06-27 DIAGNOSIS — Z34.83 ENCOUNTER FOR SUPERVISION OF OTHER NORMAL PREGNANCY IN THIRD TRIMESTER: Primary | ICD-10-CM

## 2024-06-27 LAB
ERYTHROCYTE [DISTWIDTH] IN BLOOD BY AUTOMATED COUNT: 12.1 % (ref 10–15)
FERRITIN SERPL-MCNC: 12 NG/ML (ref 6–175)
HCT VFR BLD AUTO: 33.1 % (ref 35–47)
HGB BLD-MCNC: 10.8 G/DL (ref 11.7–15.7)
HGB BLD-MCNC: 10.8 G/DL (ref 11.7–15.7)
HOLD SPECIMEN: NORMAL
IRON BINDING CAPACITY (ROCHE): 457 UG/DL (ref 240–430)
IRON SATN MFR SERPL: 7 % (ref 15–46)
IRON SERPL-MCNC: 34 UG/DL (ref 37–145)
MCH RBC QN AUTO: 28.6 PG (ref 26.5–33)
MCHC RBC AUTO-ENTMCNC: 32.6 G/DL (ref 31.5–36.5)
MCV RBC AUTO: 88 FL (ref 78–100)
PLATELET # BLD AUTO: 193 10E3/UL (ref 150–450)
RBC # BLD AUTO: 3.78 10E6/UL (ref 3.8–5.2)
WBC # BLD AUTO: 7.8 10E3/UL (ref 4–11)

## 2024-06-27 PROCEDURE — 99207 PR PRENATAL VISIT: CPT | Performed by: ADVANCED PRACTICE MIDWIFE

## 2024-06-27 PROCEDURE — 83540 ASSAY OF IRON: CPT | Performed by: ADVANCED PRACTICE MIDWIFE

## 2024-06-27 PROCEDURE — 83550 IRON BINDING TEST: CPT | Performed by: ADVANCED PRACTICE MIDWIFE

## 2024-06-27 PROCEDURE — 85027 COMPLETE CBC AUTOMATED: CPT | Performed by: ADVANCED PRACTICE MIDWIFE

## 2024-06-27 PROCEDURE — 36415 COLL VENOUS BLD VENIPUNCTURE: CPT | Performed by: ADVANCED PRACTICE MIDWIFE

## 2024-06-27 PROCEDURE — 82728 ASSAY OF FERRITIN: CPT | Performed by: ADVANCED PRACTICE MIDWIFE

## 2024-06-27 PROCEDURE — 87653 STREP B DNA AMP PROBE: CPT | Performed by: ADVANCED PRACTICE MIDWIFE

## 2024-06-27 ASSESSMENT — PATIENT HEALTH QUESTIONNAIRE - PHQ9: SUM OF ALL RESPONSES TO PHQ QUESTIONS 1-9: 5

## 2024-06-28 DIAGNOSIS — O99.013 ANEMIA DURING PREGNANCY IN THIRD TRIMESTER: Primary | ICD-10-CM

## 2024-06-28 LAB — GP B STREP DNA SPEC QL NAA+PROBE: NEGATIVE

## 2024-06-28 RX ORDER — MULTIVIT WITH MINERALS/LUTEIN
250 TABLET ORAL DAILY
Qty: 90 TABLET | Refills: 2 | Status: SHIPPED | OUTPATIENT
Start: 2024-06-28

## 2024-06-28 RX ORDER — FERROUS SULFATE 325(65) MG
325 TABLET ORAL
Qty: 90 TABLET | Refills: 2 | Status: SHIPPED | OUTPATIENT
Start: 2024-06-28

## 2024-06-28 RX ORDER — LANOLIN ALCOHOL/MO/W.PET/CERES
1000 CREAM (GRAM) TOPICAL DAILY
Qty: 90 TABLET | Refills: 2 | Status: SHIPPED | OUTPATIENT
Start: 2024-06-28

## 2024-07-03 ENCOUNTER — PRENATAL OFFICE VISIT (OUTPATIENT)
Dept: MIDWIFE SERVICES | Facility: CLINIC | Age: 34
End: 2024-07-03
Payer: COMMERCIAL

## 2024-07-03 VITALS
TEMPERATURE: 98.7 F | WEIGHT: 153 LBS | RESPIRATION RATE: 16 BRPM | DIASTOLIC BLOOD PRESSURE: 66 MMHG | HEART RATE: 92 BPM | BODY MASS INDEX: 24.69 KG/M2 | OXYGEN SATURATION: 99 % | SYSTOLIC BLOOD PRESSURE: 117 MMHG

## 2024-07-03 DIAGNOSIS — N89.8 VAGINAL ITCHING: ICD-10-CM

## 2024-07-03 DIAGNOSIS — Z34.83 ENCOUNTER FOR SUPERVISION OF OTHER NORMAL PREGNANCY IN THIRD TRIMESTER: Primary | ICD-10-CM

## 2024-07-03 LAB
CLUE CELLS: ABNORMAL
TRICHOMONAS, WET PREP: ABNORMAL
WBC'S/HIGH POWER FIELD, WET PREP: ABNORMAL
YEAST, WET PREP: ABNORMAL

## 2024-07-03 PROCEDURE — 99207 PR PRENATAL VISIT: CPT | Performed by: ADVANCED PRACTICE MIDWIFE

## 2024-07-03 PROCEDURE — 87210 SMEAR WET MOUNT SALINE/INK: CPT | Performed by: ADVANCED PRACTICE MIDWIFE

## 2024-07-03 NOTE — PROGRESS NOTES
"36w6d  S: Polly is here for prenatal visit. Having more \"lightening crotch\" lately. Requesting SVE. 1/30/-3. Also noted vaginal irritation  in the last few days. Thinks she may have a yeast infection. Wet prep collected. Baby is active, denies bleeding, leaking of fluid, headaches.     O:/66 (BP Location: Right arm, Patient Position: Sitting, Cuff Size: Adult Regular)   Pulse 92   Temp 98.7  F (37.1  C)   Resp 16   Wt 69.4 kg (153 lb)   LMP 10/19/2023   SpO2 99%   BMI 24.69 kg/m    Fundal height: 38cm    Pelvic Exam:  Vulva: No external lesions, normal hair distribution, no adenopathy  Vagina: Moist, pink, creamy white discharge, erythema at introitus, well rugated, no lesions  Cervix: 1/30/-3  Wet prep: neg    A/P:  (Z34.83) Encounter for supervision of other normal pregnancy in third trimester  (primary encounter diagnosis)    (N89.8) Vaginal itching  Plan: Wet preparation    Ad Eckert CNM        "

## 2024-07-10 ENCOUNTER — PRENATAL OFFICE VISIT (OUTPATIENT)
Dept: MIDWIFE SERVICES | Facility: CLINIC | Age: 34
End: 2024-07-10
Payer: COMMERCIAL

## 2024-07-10 VITALS
SYSTOLIC BLOOD PRESSURE: 106 MMHG | DIASTOLIC BLOOD PRESSURE: 71 MMHG | HEART RATE: 105 BPM | WEIGHT: 153.6 LBS | OXYGEN SATURATION: 100 % | BODY MASS INDEX: 24.79 KG/M2

## 2024-07-10 DIAGNOSIS — Z34.83 ENCOUNTER FOR SUPERVISION OF OTHER NORMAL PREGNANCY IN THIRD TRIMESTER: Primary | ICD-10-CM

## 2024-07-10 PROCEDURE — 99207 PR PRENATAL VISIT: CPT | Performed by: ADVANCED PRACTICE MIDWIFE

## 2024-07-10 NOTE — PROGRESS NOTES
37w6d  Polly is here with Jayy. Reports good fetal movement. Denies leaking of fluid, vaginal bleeding, regular uterine contractions, headache, visual changes, or other concerns. Feeling some contractions at night time but nothing that forms a pattern. She would like a membrane sweep at next week's visit. RTC in 1 week.    RUBEN Sidhu CNM

## 2024-07-10 NOTE — PATIENT INSTRUCTIONS
Week 38 of Your Pregnancy: Care Instructions  Believe it or not, your baby is almost here. You may notice how your baby responds to sounds, warmth, cold, and light. You may even know what kind of music your baby likes.    Even if you expect a vaginal birth, it's a good idea to learn about  section ().  is the delivery of a baby through a cut (incision) in your belly. It's a major surgery, so it has more risks than a vaginal birth.    During the first 2 weeks after the birth, limit visitors. Don't allow visitors who have colds or infections. Ask visitors to wash their hands before they touch your baby. And never let anyone smoke around your baby.    Know about unplanned C-sections.  Reasons for an unplanned  include labor that slows or stops, signs of distress in your baby, and high blood pressure or other problems for you.     Know about planned C-sections.  If your baby isn't in a head-down position for delivery (breech position), your doctor may plan a . Or you may have a planned  if you're having twins or more.     Get as much help as you can while you're in the hospital.  You can learn about feeding, diapering, and bathing your baby.     Talk to your doctor or midwife about how to care for the umbilical cord stump.  If your baby will be circumcised, also ask about how to care for that.     Ask friends or family for help, as you need it.  If you can, have another adult in your home for at least 2 or 3 days after the birth.     Try to nap when your baby naps.  This may be your best chance to get some sleep.     Watch for changes in your mental health.  For the first 1 to 2 weeks after birth, it's common to cry or feel sad or irritable. If these feelings last for more than 2 weeks, you may have postpartum depression. Ask your doctor for help. Postpartum depression can be treated.   Follow-up care is a key part of your treatment and safety. Be sure to make and go  "to all appointments, and call your doctor if you are having problems. It's also a good idea to know your test results and keep a list of the medicines you take.  Where can you learn more?  Go to https://www.Saygent.net/patiented  Enter B044 in the search box to learn more about \"Week 38 of Your Pregnancy: Care Instructions.\"  Current as of: July 10, 2023               Content Version: 14.0    8966-2475 [employer].   Care instructions adapted under license by your healthcare professional. If you have questions about a medical condition or this instruction, always ask your healthcare professional. [employer] disclaims any warranty or liability for your use of this information.      Week 39 of Your Pregnancy: Care Instructions    [place] babies can look different than what you see in pictures or movies. Their heads can be a strange shape right after birth. And they may have swollen eyes and red marks on their faces.    You can still get pregnant even if you are breastfeeding. If you don't want to get pregnant, talk to your doctor about birth control.  Tips for week 39 of pregnancy  If you plan to breastfeed, get prepared.     Continue to eat healthy foods.  Keep taking your prenatal vitamins during breastfeeding if your doctor recommends it.  Talk to your doctor before taking any medicines or supplements.  Choose the right birth control for you.     Intrauterine devices (IUDs) are placed in the uterus. Sometimes the IUD can be placed right after giving birth. They work for years.  Hormonal implants are placed under the skin of the arm. They also work for years.  Depo-Provera is a shot. You get it every 3 months.  Birth control pills can be used. They're taken every day.  Tubal ligation (tying your tubes) and vasectomy are surgeries. They're permanent.  Diaphragms, spermicide, and condoms must be used each time you have sex. If you used a diaphragm before, you should get refitted after the " "baby is born.  A birth control patch or ring can be used. These just can't be started until several weeks after you give birth.  Follow-up care is a key part of your treatment and safety. Be sure to make and go to all appointments, and call your doctor if you are having problems. It's also a good idea to know your test results and keep a list of the medicines you take.  Where can you learn more?  Go to https://www.TechSkills.net/patiented  Enter A811 in the search box to learn more about \"Week 39 of Your Pregnancy: Care Instructions.\"  Current as of: July 10, 2023               Content Version: 14.0    2377-4958 [employer].   Care instructions adapted under license by your healthcare professional. If you have questions about a medical condition or this instruction, always ask your healthcare professional. [employer] disclaims any warranty or liability for your use of this information.      Weeks 40 to 41 of Your Pregnancy: Care Instructions  By week 40, you've reached your due date. Your baby could be coming any day. Most babies born after their due dates are healthy. But you may have tests to make sure everything is okay. If you feel stressed, you could try a meditation exercise, such as guided imagery. It may help you relax.    If you are past 41 weeks, your doctor may measure the amount of fluid that surrounds your baby. They may also test your baby's movement and heart rate.    If you don't start labor on your own by 41 or 42 weeks, your doctor may want to start (induce) labor. If there are other concerns, your doctor may talk to you about a .  To start (induce) your labor, your doctor may do any of these things.    Place a narrow tube with a small balloon on the end (balloon catheter) into your cervix.  The doctor inflates the balloon. This helps your cervix open (dilate).     Sweep the membranes (separate the lining of the amniotic sac from the uterus).  This helps the " "uterus make a chemical that can start contractions.     \"Break your water\" (rupture the amniotic sac).  This may be done if your cervix has started to open.     Use medicines.  They may be used to soften the cervix or start contractions.   How to do guided imagery    Close your eyes, and take a few deep breaths. Picture a peaceful setting, such as a beach or a meadow. Add a winding path. Follow the path until you feel more and more relaxed.    Take a few minutes to breathe slowly and feel the calm. When you are ready, slowly take yourself back to the present. Count to 3, and open your eyes.  Follow-up care is a key part of your treatment and safety. Be sure to make and go to all appointments, and call your doctor if you are having problems. It's also a good idea to know your test results and keep a list of the medicines you take.  Where can you learn more?  Go to https://www.Socialeyes App.[place]/patiented  Enter T922 in the search box to learn more about \"Weeks 40 to 41 of Your Pregnancy: Care Instructions.\"  Current as of: July 10, 2023               Content Version: 14.0    7404-4780 [employer].   Care instructions adapted under license by your healthcare professional. If you have questions about a medical condition or this instruction, always ask your healthcare professional. [employer] disclaims any warranty or liability for your use of this information.      Labor Induction  What You Need to Know  What is labor induction?  In most cases, it is best to go into labor naturally. When labor does not start on its own, we may use medicine or other methods to start (induce) labor. This is called induction, which:  Helps the uterus contract  Thins, softens and opens the cervix (opening of the uterus)  When is induction used?  There are two types of induction.  Medical induction may be done to protect the health of the parent or baby if:  There are medical concerns for you or your baby.  You " "haven't had your baby by 41 weeks.  Induction for non-medical reasons may be done at 39 weeks or later if:  You live far from the hospital.  You've been having contractions for many days.  You've given birth quickly in the past.   We will not perform an induction for non-medical reasons before 39 weeks. Studies show that babies born before 39 weeks may struggle with breathing, feeding, sleeping and staying warm. They are more likely to have health problems and may need to stay in the hospital longer. If we start your labor for medical reasons, the benefits will outweigh these risks. We will talk to you about your risks, benefits and alternatives (other options) before we start your labor.  How is induction done?  We may start your labor by doing one or more of the following:  We may need to help your cervix soften and open (sometimes called \"ripening\") to prepare it for labor. There are two ways to do this:  Medicines--these may be in the form of pills that you swallow or medicines that are put into the vagina next to the cervix.  Mechanical--using either a single or double balloon. These are small balloons that are attached to tubes that go up inside the cervix. The balloons are then filled with water to put pressure on the cervix and help the cervix soften and open. Depending on the type of balloon used, you may be allowed to go home after it is placed.  After your cervix is ready:  We may give you medicine through an IV (a small tube placed in your vein). This medicine is called Pitocin. It helps the muscles of your uterus to contract.  We may make a small opening in your bag of water (the sac around your baby). This is called an amniotomy. Sometimes called \"breaking the water\". It may help your uterus contract and your cervix open.  What might happen if my labor is induced?  Some of this depends on how your labor is started and how your body responds. Your labor may be more complicated. You and your baby may " need more medical treatments. In general:  You may not go into labor right away. If so, we may send you home with follow-up instructions.  It will be important to monitor you and your baby during the induction.  You may not be able to move around during labor. You will have two belts with monitors attached to your belly. These allow the nurse to watch your contractions and your baby's heart rate.  Your labor may take longer than if you went into labor on your own. It might take more than one day.  If you need cervical ripening, it is important to know that it may be many hours (even days) until delivery happens.  Cervical ripening can be slow and require several doses before your body is ready to labor.  A long labor may increase the risk of infection in mother and baby.  Your labor may not progress as planned. This could lead to a  birth.  Can I plan the date of my delivery?  After 39 weeks, you may ask about planning your delivery date. This is only an option if your body--and your baby--are ready. To find out, we will check your cervix and your baby's heart rate.   If you are ready to be induced, we will give you a date and time to come to the hospital. If many patients are in labor that day, we may need to start your labor at another time.  If you are not ready, we will not start your labor. It will be safer for your baby to come on its own.  What else do I need to know?  Before you have an induction, make sure you understand the reasons, risks and benefits. Ask your doctor or nurse-midwife:  Why do I need to be induced?  What are the risks to my baby?  How will you start my labor?  How will you know if my baby is ready to be born?  How will you know if my body is ready to give birth?  Where can I get more information?  To learn more about induction, you may visit these websites:  The American College of Nurse-Midwives:  www.mymidwife.org  The American College of Obstetricians and Gynecologists:  www.acog.org  Childbirth Connection:  www.childbirthconnection.org  March of Dimes: www.marchofdimes.com  Association of Women's Health, Obstetrics, and  Nursing  www.hqoiag5dri.org/go-the-full-40&#047;  For informational purposes only. Not to replace the advice of your health care provider. Copyright   2008 [facility]. All rights reserved. Clinically reviewed by the  System Operations Leadership team. Kon[employer] [id number] - REV .

## 2024-07-17 ENCOUNTER — PRENATAL OFFICE VISIT (OUTPATIENT)
Dept: MIDWIFE SERVICES | Facility: CLINIC | Age: 34
End: 2024-07-17
Payer: COMMERCIAL

## 2024-07-17 VITALS
HEART RATE: 80 BPM | WEIGHT: 155.8 LBS | TEMPERATURE: 97.5 F | OXYGEN SATURATION: 100 % | HEIGHT: 66 IN | SYSTOLIC BLOOD PRESSURE: 111 MMHG | DIASTOLIC BLOOD PRESSURE: 72 MMHG | BODY MASS INDEX: 25.04 KG/M2

## 2024-07-17 DIAGNOSIS — Z34.83 ENCOUNTER FOR SUPERVISION OF OTHER NORMAL PREGNANCY IN THIRD TRIMESTER: Primary | ICD-10-CM

## 2024-07-17 DIAGNOSIS — O36.8390 IRREGULAR FETAL HEART RATE: ICD-10-CM

## 2024-07-17 PROCEDURE — 59025 FETAL NON-STRESS TEST: CPT | Performed by: ADVANCED PRACTICE MIDWIFE

## 2024-07-17 PROCEDURE — 99207 PR COMPLICATED OB VISIT: CPT | Performed by: ADVANCED PRACTICE MIDWIFE

## 2024-07-17 NOTE — PROGRESS NOTES
38w6d lo Matias is doing well and presents today with Aarion. She denies bleeding or loss of fluid. Reports good fetal movement. Reports irregular, mild contractions. Desires cervical check and membrane sweep today. Cervix 1.5cm/50%/-2, membranes swept with pt consent. Doppler with FHTs noted in 110s, possible decel heard. Placed on NST.   NST indication concern for fetal bradycardia on doppler  NST duration 23 mins  Baseline - 110  Variability - moderate  Accels -present, >2 15x15 accelerations  Decels - absent  NST reactive   Uterine activity - contractions x2, mildly felt by pt       Labor precautions and fetal movement precautions reviewed. Follow up with concerns. Reports fast labors - reviewed low threshold to come in for eval.     RTC 1wk  RUBEN [place], SHIRLEYM

## 2024-07-18 ENCOUNTER — DOCUMENTATION ONLY (OUTPATIENT)
Dept: MIDWIFE SERVICES | Facility: CLINIC | Age: 34
End: 2024-07-18
Payer: COMMERCIAL

## 2024-07-18 NOTE — PROGRESS NOTES
Script received from [employer] for PP garment, completed/faxed back to [employer].  Grisel Keen RN on 7/18/2024 at 4:00 PM

## 2024-07-24 ENCOUNTER — PRENATAL OFFICE VISIT (OUTPATIENT)
Dept: MIDWIFE SERVICES | Facility: CLINIC | Age: 34
End: 2024-07-24
Payer: COMMERCIAL

## 2024-07-24 VITALS
DIASTOLIC BLOOD PRESSURE: 72 MMHG | HEART RATE: 85 BPM | BODY MASS INDEX: 25.15 KG/M2 | RESPIRATION RATE: 16 BRPM | OXYGEN SATURATION: 100 % | TEMPERATURE: 97.5 F | SYSTOLIC BLOOD PRESSURE: 106 MMHG | WEIGHT: 155.8 LBS

## 2024-07-24 DIAGNOSIS — Z34.83 ENCOUNTER FOR SUPERVISION OF OTHER NORMAL PREGNANCY IN THIRD TRIMESTER: Primary | ICD-10-CM

## 2024-07-24 DIAGNOSIS — O48.0 POST-TERM PREGNANCY, 40-42 WEEKS OF GESTATION: ICD-10-CM

## 2024-07-24 DIAGNOSIS — N89.8 VAGINAL DISCHARGE: ICD-10-CM

## 2024-07-24 LAB — RUPTURE OF FETAL MEMBRANES BY ROM PLUS: NEGATIVE

## 2024-07-24 PROCEDURE — 99207 PR COMPLICATED OB VISIT: CPT | Performed by: ADVANCED PRACTICE MIDWIFE

## 2024-07-24 PROCEDURE — 84112 EVAL AMNIOTIC FLUID PROTEIN: CPT | Performed by: ADVANCED PRACTICE MIDWIFE

## 2024-07-27 ENCOUNTER — NURSE TRIAGE (OUTPATIENT)
Dept: NURSING | Facility: CLINIC | Age: 34
End: 2024-07-27
Payer: COMMERCIAL

## 2024-07-27 ENCOUNTER — ANESTHESIA (OUTPATIENT)
Dept: OBGYN | Facility: CLINIC | Age: 34
End: 2024-07-27
Payer: COMMERCIAL

## 2024-07-27 ENCOUNTER — HOSPITAL ENCOUNTER (INPATIENT)
Facility: CLINIC | Age: 34
LOS: 2 days | Discharge: HOME HEALTH | End: 2024-07-29
Attending: ADVANCED PRACTICE MIDWIFE | Admitting: ADVANCED PRACTICE MIDWIFE
Payer: COMMERCIAL

## 2024-07-27 ENCOUNTER — ANESTHESIA EVENT (OUTPATIENT)
Dept: OBGYN | Facility: CLINIC | Age: 34
End: 2024-07-27
Payer: COMMERCIAL

## 2024-07-27 PROBLEM — Z34.80 SUPERVISION OF OTHER NORMAL PREGNANCY, ANTEPARTUM: Status: ACTIVE | Noted: 2024-07-27

## 2024-07-27 PROBLEM — Z36.89 ENCOUNTER FOR TRIAGE IN PREGNANT PATIENT: Status: ACTIVE | Noted: 2024-07-27

## 2024-07-27 LAB
ABO/RH(D): NORMAL
ANTIBODY SCREEN: NEGATIVE
ERYTHROCYTE [DISTWIDTH] IN BLOOD BY AUTOMATED COUNT: 12.6 % (ref 10–15)
HCT VFR BLD AUTO: 30.8 % (ref 35–47)
HGB BLD-MCNC: 10.4 G/DL (ref 11.7–15.7)
MCH RBC QN AUTO: 28.9 PG (ref 26.5–33)
MCHC RBC AUTO-ENTMCNC: 33.8 G/DL (ref 31.5–36.5)
MCV RBC AUTO: 86 FL (ref 78–100)
PLATELET # BLD AUTO: 225 10E3/UL (ref 150–450)
RBC # BLD AUTO: 3.6 10E6/UL (ref 3.8–5.2)
SPECIMEN EXPIRATION DATE: NORMAL
WBC # BLD AUTO: 10 10E3/UL (ref 4–11)

## 2024-07-27 PROCEDURE — 3E0R3BZ INTRODUCTION OF ANESTHETIC AGENT INTO SPINAL CANAL, PERCUTANEOUS APPROACH: ICD-10-PCS | Performed by: STUDENT IN AN ORGANIZED HEALTH CARE EDUCATION/TRAINING PROGRAM

## 2024-07-27 PROCEDURE — 258N000003 HC RX IP 258 OP 636: Performed by: ADVANCED PRACTICE MIDWIFE

## 2024-07-27 PROCEDURE — 86780 TREPONEMA PALLIDUM: CPT | Performed by: ADVANCED PRACTICE MIDWIFE

## 2024-07-27 PROCEDURE — 59400 OBSTETRICAL CARE: CPT | Performed by: STUDENT IN AN ORGANIZED HEALTH CARE EDUCATION/TRAINING PROGRAM

## 2024-07-27 PROCEDURE — 370N000003 HC ANESTHESIA WARD SERVICE: Performed by: STUDENT IN AN ORGANIZED HEALTH CARE EDUCATION/TRAINING PROGRAM

## 2024-07-27 PROCEDURE — 00HU33Z INSERTION OF INFUSION DEVICE INTO SPINAL CANAL, PERCUTANEOUS APPROACH: ICD-10-PCS | Performed by: STUDENT IN AN ORGANIZED HEALTH CARE EDUCATION/TRAINING PROGRAM

## 2024-07-27 PROCEDURE — G0463 HOSPITAL OUTPT CLINIC VISIT: HCPCS

## 2024-07-27 PROCEDURE — 250N000011 HC RX IP 250 OP 636: Performed by: STUDENT IN AN ORGANIZED HEALTH CARE EDUCATION/TRAINING PROGRAM

## 2024-07-27 PROCEDURE — 85014 HEMATOCRIT: CPT | Performed by: ADVANCED PRACTICE MIDWIFE

## 2024-07-27 PROCEDURE — 120N000002 HC R&B MED SURG/OB UMMC

## 2024-07-27 PROCEDURE — 86900 BLOOD TYPING SEROLOGIC ABO: CPT | Performed by: ADVANCED PRACTICE MIDWIFE

## 2024-07-27 RX ORDER — FENTANYL CITRATE 50 UG/ML
100 INJECTION, SOLUTION INTRAMUSCULAR; INTRAVENOUS
Status: DISCONTINUED | OUTPATIENT
Start: 2024-07-27 | End: 2024-07-28 | Stop reason: HOSPADM

## 2024-07-27 RX ORDER — OXYTOCIN 10 [USP'U]/ML
INJECTION, SOLUTION INTRAMUSCULAR; INTRAVENOUS
Status: DISCONTINUED
Start: 2024-07-27 | End: 2024-07-28 | Stop reason: WASHOUT

## 2024-07-27 RX ORDER — KETOROLAC TROMETHAMINE 30 MG/ML
30 INJECTION, SOLUTION INTRAMUSCULAR; INTRAVENOUS
Status: COMPLETED | OUTPATIENT
Start: 2024-07-27 | End: 2024-07-28

## 2024-07-27 RX ORDER — MISOPROSTOL 200 UG/1
400 TABLET ORAL
Status: DISCONTINUED | OUTPATIENT
Start: 2024-07-27 | End: 2024-07-28 | Stop reason: HOSPADM

## 2024-07-27 RX ORDER — LOPERAMIDE HCL 2 MG
2 CAPSULE ORAL
Status: DISCONTINUED | OUTPATIENT
Start: 2024-07-27 | End: 2024-07-28 | Stop reason: HOSPADM

## 2024-07-27 RX ORDER — PROCHLORPERAZINE MALEATE 10 MG
10 TABLET ORAL EVERY 6 HOURS PRN
Status: DISCONTINUED | OUTPATIENT
Start: 2024-07-27 | End: 2024-07-28 | Stop reason: HOSPADM

## 2024-07-27 RX ORDER — LOPERAMIDE HCL 2 MG
4 CAPSULE ORAL
Status: DISCONTINUED | OUTPATIENT
Start: 2024-07-27 | End: 2024-07-28 | Stop reason: HOSPADM

## 2024-07-27 RX ORDER — LIDOCAINE 40 MG/G
CREAM TOPICAL
Status: DISCONTINUED | OUTPATIENT
Start: 2024-07-27 | End: 2024-07-28 | Stop reason: HOSPADM

## 2024-07-27 RX ORDER — NALOXONE HYDROCHLORIDE 0.4 MG/ML
0.4 INJECTION, SOLUTION INTRAMUSCULAR; INTRAVENOUS; SUBCUTANEOUS
Status: DISCONTINUED | OUTPATIENT
Start: 2024-07-27 | End: 2024-07-28 | Stop reason: HOSPADM

## 2024-07-27 RX ORDER — IBUPROFEN 800 MG/1
800 TABLET, FILM COATED ORAL
Status: COMPLETED | OUTPATIENT
Start: 2024-07-27 | End: 2024-07-28

## 2024-07-27 RX ORDER — ACETAMINOPHEN 325 MG/1
650 TABLET ORAL EVERY 4 HOURS PRN
Status: DISCONTINUED | OUTPATIENT
Start: 2024-07-27 | End: 2024-07-28 | Stop reason: HOSPADM

## 2024-07-27 RX ORDER — OXYTOCIN 10 [USP'U]/ML
10 INJECTION, SOLUTION INTRAMUSCULAR; INTRAVENOUS
Status: DISCONTINUED | OUTPATIENT
Start: 2024-07-27 | End: 2024-07-28 | Stop reason: HOSPADM

## 2024-07-27 RX ORDER — LIDOCAINE HYDROCHLORIDE 10 MG/ML
INJECTION, SOLUTION EPIDURAL; INFILTRATION; INTRACAUDAL; PERINEURAL
Status: DISCONTINUED
Start: 2024-07-27 | End: 2024-07-28 | Stop reason: WASHOUT

## 2024-07-27 RX ORDER — CITRIC ACID/SODIUM CITRATE 334-500MG
30 SOLUTION, ORAL ORAL ONCE
Status: DISCONTINUED | OUTPATIENT
Start: 2024-07-27 | End: 2024-07-28 | Stop reason: HOSPADM

## 2024-07-27 RX ORDER — OXYTOCIN/0.9 % SODIUM CHLORIDE 30/500 ML
100-340 PLASTIC BAG, INJECTION (ML) INTRAVENOUS CONTINUOUS PRN
Status: DISCONTINUED | OUTPATIENT
Start: 2024-07-27 | End: 2024-07-29 | Stop reason: HOSPADM

## 2024-07-27 RX ORDER — OXYTOCIN/0.9 % SODIUM CHLORIDE 30/500 ML
340 PLASTIC BAG, INJECTION (ML) INTRAVENOUS CONTINUOUS PRN
Status: DISCONTINUED | OUTPATIENT
Start: 2024-07-27 | End: 2024-07-28 | Stop reason: HOSPADM

## 2024-07-27 RX ORDER — ONDANSETRON 4 MG/1
4 TABLET, ORALLY DISINTEGRATING ORAL EVERY 6 HOURS PRN
Status: DISCONTINUED | OUTPATIENT
Start: 2024-07-27 | End: 2024-07-28 | Stop reason: HOSPADM

## 2024-07-27 RX ORDER — FENTANYL/ROPIVACAINE/NS/PF 2MCG/ML-.1
PLASTIC BAG, INJECTION (ML) EPIDURAL
Status: DISCONTINUED | OUTPATIENT
Start: 2024-07-27 | End: 2024-07-28

## 2024-07-27 RX ORDER — CARBOPROST TROMETHAMINE 250 UG/ML
250 INJECTION, SOLUTION INTRAMUSCULAR
Status: DISCONTINUED | OUTPATIENT
Start: 2024-07-27 | End: 2024-07-28 | Stop reason: HOSPADM

## 2024-07-27 RX ORDER — METHYLERGONOVINE MALEATE 0.2 MG/ML
200 INJECTION INTRAVENOUS
Status: DISCONTINUED | OUTPATIENT
Start: 2024-07-27 | End: 2024-07-28 | Stop reason: HOSPADM

## 2024-07-27 RX ORDER — LIDOCAINE 40 MG/G
CREAM TOPICAL
Status: DISCONTINUED | OUTPATIENT
Start: 2024-07-27 | End: 2024-07-27

## 2024-07-27 RX ORDER — NALOXONE HYDROCHLORIDE 0.4 MG/ML
0.2 INJECTION, SOLUTION INTRAMUSCULAR; INTRAVENOUS; SUBCUTANEOUS
Status: DISCONTINUED | OUTPATIENT
Start: 2024-07-27 | End: 2024-07-28 | Stop reason: HOSPADM

## 2024-07-27 RX ORDER — OXYTOCIN/0.9 % SODIUM CHLORIDE 30/500 ML
PLASTIC BAG, INJECTION (ML) INTRAVENOUS
Status: DISCONTINUED
Start: 2024-07-27 | End: 2024-07-28 | Stop reason: HOSPADM

## 2024-07-27 RX ORDER — NALBUPHINE HYDROCHLORIDE 10 MG/ML
2.5-5 INJECTION, SOLUTION INTRAMUSCULAR; INTRAVENOUS; SUBCUTANEOUS EVERY 6 HOURS PRN
Status: DISCONTINUED | OUTPATIENT
Start: 2024-07-27 | End: 2024-07-28

## 2024-07-27 RX ORDER — FENTANYL CITRATE-0.9 % NACL/PF 10 MCG/ML
100 PLASTIC BAG, INJECTION (ML) INTRAVENOUS EVERY 5 MIN PRN
Status: DISCONTINUED | OUTPATIENT
Start: 2024-07-27 | End: 2024-07-28

## 2024-07-27 RX ORDER — METOCLOPRAMIDE 10 MG/1
10 TABLET ORAL EVERY 6 HOURS PRN
Status: DISCONTINUED | OUTPATIENT
Start: 2024-07-27 | End: 2024-07-28 | Stop reason: HOSPADM

## 2024-07-27 RX ORDER — METOCLOPRAMIDE HYDROCHLORIDE 5 MG/ML
10 INJECTION INTRAMUSCULAR; INTRAVENOUS EVERY 6 HOURS PRN
Status: DISCONTINUED | OUTPATIENT
Start: 2024-07-27 | End: 2024-07-28 | Stop reason: HOSPADM

## 2024-07-27 RX ORDER — SODIUM CHLORIDE, SODIUM LACTATE, POTASSIUM CHLORIDE, CALCIUM CHLORIDE 600; 310; 30; 20 MG/100ML; MG/100ML; MG/100ML; MG/100ML
INJECTION, SOLUTION INTRAVENOUS CONTINUOUS
Status: DISCONTINUED | OUTPATIENT
Start: 2024-07-27 | End: 2024-07-28 | Stop reason: HOSPADM

## 2024-07-27 RX ORDER — OXYTOCIN 10 [USP'U]/ML
10 INJECTION, SOLUTION INTRAMUSCULAR; INTRAVENOUS
Status: DISCONTINUED | OUTPATIENT
Start: 2024-07-27 | End: 2024-07-29 | Stop reason: HOSPADM

## 2024-07-27 RX ORDER — CITRIC ACID/SODIUM CITRATE 334-500MG
30 SOLUTION, ORAL ORAL
Status: DISCONTINUED | OUTPATIENT
Start: 2024-07-27 | End: 2024-07-28 | Stop reason: HOSPADM

## 2024-07-27 RX ORDER — TRANEXAMIC ACID 10 MG/ML
1 INJECTION, SOLUTION INTRAVENOUS EVERY 30 MIN PRN
Status: DISCONTINUED | OUTPATIENT
Start: 2024-07-27 | End: 2024-07-28 | Stop reason: HOSPADM

## 2024-07-27 RX ORDER — PROCHLORPERAZINE 25 MG
25 SUPPOSITORY, RECTAL RECTAL EVERY 12 HOURS PRN
Status: DISCONTINUED | OUTPATIENT
Start: 2024-07-27 | End: 2024-07-28 | Stop reason: HOSPADM

## 2024-07-27 RX ORDER — MISOPROSTOL 200 UG/1
800 TABLET ORAL
Status: DISCONTINUED | OUTPATIENT
Start: 2024-07-27 | End: 2024-07-28 | Stop reason: HOSPADM

## 2024-07-27 RX ORDER — ONDANSETRON 2 MG/ML
4 INJECTION INTRAMUSCULAR; INTRAVENOUS EVERY 6 HOURS PRN
Status: DISCONTINUED | OUTPATIENT
Start: 2024-07-27 | End: 2024-07-28 | Stop reason: HOSPADM

## 2024-07-27 RX ADMIN — SODIUM CHLORIDE, POTASSIUM CHLORIDE, SODIUM LACTATE AND CALCIUM CHLORIDE: 600; 310; 30; 20 INJECTION, SOLUTION INTRAVENOUS at 23:49

## 2024-07-27 RX ADMIN — Medication 10 ML: at 23:21

## 2024-07-27 RX ADMIN — SODIUM CHLORIDE, POTASSIUM CHLORIDE, SODIUM LACTATE AND CALCIUM CHLORIDE 1000 ML: 600; 310; 30; 20 INJECTION, SOLUTION INTRAVENOUS at 22:45

## 2024-07-27 RX ADMIN — Medication: at 23:31

## 2024-07-27 ASSESSMENT — ACTIVITIES OF DAILY LIVING (ADL)
ADLS_ACUITY_SCORE: 20
ADLS_ACUITY_SCORE: 33
ADLS_ACUITY_SCORE: 20
ADLS_ACUITY_SCORE: 20

## 2024-07-27 NOTE — TELEPHONE ENCOUNTER
OB Triage Call      Is patient's OB/Midwife with the [facility] or [facility]? [facility]- Proceed with triage     Reason for call: Patient is having contractions approximately every 15 minutes, and the contractions are getting more painful and the contractions are getting more intense. She is unable to talk through the contractions, and when they come, she has pain in her buttocks. History of precipitous labor per patient. She has bloody show and is passing her mucus plug.     Assessment: active labor     Plan: vaginal delivery    Patient plans to deliver at [facility]    Patient's primary OB Provider is [facility].      Per protocol recommendations Patient to be evaluated in L&D. Patient's primary OB is [facility]. Paged on-call midwife for patient's primary OB clinic (refer to where patient is seen as midwives may go to multiple locations) DARIA Glez to call FNA back at [phone].  Call returned at [phone] and advised on triage assessment. Does midwife recommend L&D evaluation? Yes-  Labor and delivery at [facility] ([phone]) notified of patient's pending arrival. Patient notified to go to L&D by RN. Report called to CINDY Do.        Is patient's delivering hospital on divert? No      40w2d    Estimated Date of Delivery: 2024        OB History    Para Term  AB Living   12 3 3 0 8 3   SAB IAB Ectopic Multiple Live Births   4 4 0 0 3      # Outcome Date GA Lbr Asa/2nd Weight Sex Type Anes PTL Lv   12 Current            11 Term 10/30/19 41w1d  3.6 kg (7 lb 15 oz) F Vag-Spont   CAMPBELL      Name: BG VELMA MANISHA      Apgar1: 9  Apgar5: 9   10 IAB 19           9 IAB 17           8 IAB 16           7 IAB 01/01/15           6 SAB 14           5 SAB 13           4 Term 12 39w0d 05:35 / 00:27 2.58 kg (5 lb 11 oz) M Vag-Spont EPI N CAMPBELL      Name: MARIANA CARREON MANISHA      Apgar1: 9  Apgar5: 9   3 SAB 11           2 SAB  "11              Birth Comments: No D&C   1 Term 08/25/10 40w3d  3.544 kg (7 lb 13 oz) F Vag-Spont EPI N CAMPBELL      Birth Comments: hx of 1 min shoulder dystocia      Apgar1: 6  Apgar5: 9       Lab Results   Component Value Date    GBS  2012     Negative: No GBS DNA detected, presumed negative for GBS or number of bacteria   may be below the limit of detection of the assay.   Assay performed on incubated broth culture of specimen using Cepheid   SmartCycler(R) real-time PCR.          Vianney Candelaria RN   Reason for Disposition   Vaginal bleeding or spotting  (Exception: Brief spotting after intercourse or pelvic exam.)    Additional Information   Negative: Passed out (i.e., lost consciousness, collapsed and was not responding)   Negative: Shock suspected (e.g., cold/pale/clammy skin, too weak to stand, low BP, rapid pulse)   Negative: Difficult to awaken or acting confused (e.g., disoriented, slurred speech)   Negative: [1] SEVERE abdominal pain (e.g., excruciating) AND [2] constant AND [3] present > 1 hour   Negative: SEVERE bleeding (e.g., continuous red blood from vagina, or large blood clots)   Negative: Umbilical cord hanging out of the vagina (shiny, white, curled appearance, \"like telephone cord\")   Negative: Uncontrollable urge to push (i.e., feels like baby is coming out now)   Negative: Can see baby   Negative: Sounds like a life-threatening emergency to the triager   Negative: Pregnant < 37 weeks (i.e., )   Negative: [1] Uncertain delivery date AND [2] possibly pregnant < 37 weeks (i.e., )   Negative: [1] First baby (primipara) AND [2] contractions < 6 minutes apart  AND [3] present 2 hours   Negative: [1] History of prior delivery (multipara) AND [2] contractions < 10 minutes apart AND [3] present 1 hour   Negative: [1] History of rapid prior delivery AND [2] contractions < 10 minutes apart   Negative: [1] Leakage of fluid from vagina AND [2] green or brown in color   Negative: " Leakage of fluid from vagina    Protocols used: Pregnancy - Labor-A-AH

## 2024-07-28 ENCOUNTER — MEDICAL CORRESPONDENCE (OUTPATIENT)
Dept: HEALTH INFORMATION MANAGEMENT | Facility: CLINIC | Age: 34
End: 2024-07-28

## 2024-07-28 LAB — T PALLIDUM AB SER QL: NONREACTIVE

## 2024-07-28 PROCEDURE — 120N000002 HC R&B MED SURG/OB UMMC

## 2024-07-28 PROCEDURE — 250N000009 HC RX 250: Performed by: ADVANCED PRACTICE MIDWIFE

## 2024-07-28 PROCEDURE — 250N000011 HC RX IP 250 OP 636: Performed by: ADVANCED PRACTICE MIDWIFE

## 2024-07-28 PROCEDURE — 250N000011 HC RX IP 250 OP 636: Performed by: STUDENT IN AN ORGANIZED HEALTH CARE EDUCATION/TRAINING PROGRAM

## 2024-07-28 PROCEDURE — 59400 OBSTETRICAL CARE: CPT | Performed by: ADVANCED PRACTICE MIDWIFE

## 2024-07-28 PROCEDURE — 722N000001 HC LABOR CARE VAGINAL DELIVERY SINGLE

## 2024-07-28 PROCEDURE — 10907ZC DRAINAGE OF AMNIOTIC FLUID, THERAPEUTIC FROM PRODUCTS OF CONCEPTION, VIA NATURAL OR ARTIFICIAL OPENING: ICD-10-PCS | Performed by: ADVANCED PRACTICE MIDWIFE

## 2024-07-28 PROCEDURE — 250N000013 HC RX MED GY IP 250 OP 250 PS 637: Performed by: ADVANCED PRACTICE MIDWIFE

## 2024-07-28 PROCEDURE — G0463 HOSPITAL OUTPT CLINIC VISIT: HCPCS

## 2024-07-28 PROCEDURE — 258N000003 HC RX IP 258 OP 636: Performed by: ADVANCED PRACTICE MIDWIFE

## 2024-07-28 PROCEDURE — 258N000003 HC RX IP 258 OP 636: Performed by: STUDENT IN AN ORGANIZED HEALTH CARE EDUCATION/TRAINING PROGRAM

## 2024-07-28 RX ORDER — DOCUSATE SODIUM 100 MG/1
100 CAPSULE, LIQUID FILLED ORAL DAILY
Status: DISCONTINUED | OUTPATIENT
Start: 2024-07-28 | End: 2024-07-29 | Stop reason: HOSPADM

## 2024-07-28 RX ORDER — ACETAMINOPHEN 325 MG/1
650 TABLET ORAL EVERY 4 HOURS PRN
Status: DISCONTINUED | OUTPATIENT
Start: 2024-07-28 | End: 2024-07-29 | Stop reason: HOSPADM

## 2024-07-28 RX ORDER — HYDROCORTISONE 25 MG/G
CREAM TOPICAL 3 TIMES DAILY PRN
Status: DISCONTINUED | OUTPATIENT
Start: 2024-07-28 | End: 2024-07-29 | Stop reason: HOSPADM

## 2024-07-28 RX ORDER — MODIFIED LANOLIN
OINTMENT (GRAM) TOPICAL
Status: DISCONTINUED | OUTPATIENT
Start: 2024-07-28 | End: 2024-07-29 | Stop reason: HOSPADM

## 2024-07-28 RX ORDER — SODIUM CHLORIDE, SODIUM LACTATE, POTASSIUM CHLORIDE, CALCIUM CHLORIDE 600; 310; 30; 20 MG/100ML; MG/100ML; MG/100ML; MG/100ML
INJECTION, SOLUTION INTRAVENOUS CONTINUOUS PRN
Status: DISCONTINUED | OUTPATIENT
Start: 2024-07-28 | End: 2024-07-28

## 2024-07-28 RX ORDER — LIDOCAINE 40 MG/G
CREAM TOPICAL
Status: DISCONTINUED | OUTPATIENT
Start: 2024-07-28 | End: 2024-07-28

## 2024-07-28 RX ORDER — IBUPROFEN 800 MG/1
800 TABLET, FILM COATED ORAL EVERY 6 HOURS PRN
Status: DISCONTINUED | OUTPATIENT
Start: 2024-07-28 | End: 2024-07-29 | Stop reason: HOSPADM

## 2024-07-28 RX ORDER — CARBOPROST TROMETHAMINE 250 UG/ML
250 INJECTION, SOLUTION INTRAMUSCULAR
Status: DISCONTINUED | OUTPATIENT
Start: 2024-07-28 | End: 2024-07-29 | Stop reason: HOSPADM

## 2024-07-28 RX ORDER — BISACODYL 10 MG
10 SUPPOSITORY, RECTAL RECTAL DAILY PRN
Status: DISCONTINUED | OUTPATIENT
Start: 2024-07-28 | End: 2024-07-29 | Stop reason: HOSPADM

## 2024-07-28 RX ORDER — TRANEXAMIC ACID 10 MG/ML
1 INJECTION, SOLUTION INTRAVENOUS EVERY 30 MIN PRN
Status: DISCONTINUED | OUTPATIENT
Start: 2024-07-28 | End: 2024-07-29 | Stop reason: HOSPADM

## 2024-07-28 RX ORDER — OXYTOCIN 10 [USP'U]/ML
10 INJECTION, SOLUTION INTRAMUSCULAR; INTRAVENOUS
Status: DISCONTINUED | OUTPATIENT
Start: 2024-07-28 | End: 2024-07-29 | Stop reason: HOSPADM

## 2024-07-28 RX ORDER — METHYLERGONOVINE MALEATE 0.2 MG/ML
200 INJECTION INTRAVENOUS
Status: DISCONTINUED | OUTPATIENT
Start: 2024-07-28 | End: 2024-07-29 | Stop reason: HOSPADM

## 2024-07-28 RX ORDER — OXYTOCIN/0.9 % SODIUM CHLORIDE 30/500 ML
340 PLASTIC BAG, INJECTION (ML) INTRAVENOUS CONTINUOUS PRN
Status: DISCONTINUED | OUTPATIENT
Start: 2024-07-28 | End: 2024-07-29 | Stop reason: HOSPADM

## 2024-07-28 RX ORDER — TERBUTALINE SULFATE 1 MG/ML
0.25 INJECTION, SOLUTION SUBCUTANEOUS
Status: DISCONTINUED | OUTPATIENT
Start: 2024-07-28 | End: 2024-07-28

## 2024-07-28 RX ORDER — OXYTOCIN/0.9 % SODIUM CHLORIDE 30/500 ML
1-24 PLASTIC BAG, INJECTION (ML) INTRAVENOUS CONTINUOUS
Status: DISCONTINUED | OUTPATIENT
Start: 2024-07-28 | End: 2024-07-28

## 2024-07-28 RX ORDER — MISOPROSTOL 200 UG/1
800 TABLET ORAL
Status: DISCONTINUED | OUTPATIENT
Start: 2024-07-28 | End: 2024-07-29 | Stop reason: HOSPADM

## 2024-07-28 RX ORDER — LOPERAMIDE HCL 2 MG
2 CAPSULE ORAL
Status: DISCONTINUED | OUTPATIENT
Start: 2024-07-28 | End: 2024-07-29 | Stop reason: HOSPADM

## 2024-07-28 RX ORDER — LOPERAMIDE HCL 2 MG
4 CAPSULE ORAL
Status: DISCONTINUED | OUTPATIENT
Start: 2024-07-28 | End: 2024-07-29 | Stop reason: HOSPADM

## 2024-07-28 RX ORDER — MISOPROSTOL 200 UG/1
400 TABLET ORAL
Status: DISCONTINUED | OUTPATIENT
Start: 2024-07-28 | End: 2024-07-29 | Stop reason: HOSPADM

## 2024-07-28 RX ADMIN — METOCLOPRAMIDE HYDROCHLORIDE 10 MG: 5 INJECTION INTRAMUSCULAR; INTRAVENOUS at 04:29

## 2024-07-28 RX ADMIN — SODIUM CHLORIDE, POTASSIUM CHLORIDE, SODIUM LACTATE AND CALCIUM CHLORIDE 250 ML: 600; 310; 30; 20 INJECTION, SOLUTION INTRAVENOUS at 04:17

## 2024-07-28 RX ADMIN — KETOROLAC TROMETHAMINE 30 MG: 30 INJECTION, SOLUTION INTRAMUSCULAR at 10:01

## 2024-07-28 RX ADMIN — SODIUM CHLORIDE, POTASSIUM CHLORIDE, SODIUM LACTATE AND CALCIUM CHLORIDE: 600; 310; 30; 20 INJECTION, SOLUTION INTRAVENOUS at 06:03

## 2024-07-28 RX ADMIN — NALBUPHINE HYDROCHLORIDE 2.5 MG: 10 INJECTION, SOLUTION INTRAMUSCULAR; INTRAVENOUS; SUBCUTANEOUS at 02:00

## 2024-07-28 RX ADMIN — Medication: at 08:08

## 2024-07-28 RX ADMIN — NALBUPHINE HYDROCHLORIDE 2.5 MG: 10 INJECTION, SOLUTION INTRAMUSCULAR; INTRAVENOUS; SUBCUTANEOUS at 03:33

## 2024-07-28 RX ADMIN — DOCUSATE SODIUM 100 MG: 100 CAPSULE, LIQUID FILLED ORAL at 12:38

## 2024-07-28 RX ADMIN — Medication 2 MILLI-UNITS/MIN: at 05:24

## 2024-07-28 RX ADMIN — ONDANSETRON 4 MG: 2 INJECTION INTRAMUSCULAR; INTRAVENOUS at 05:52

## 2024-07-28 ASSESSMENT — ACTIVITIES OF DAILY LIVING (ADL)
ADLS_ACUITY_SCORE: 20

## 2024-07-28 NOTE — PROGRESS NOTES
"S: Polly is doing well and feeling good with epidural. Requesting a cervical check, cervix 4/90/-2, no bulging bag which was felt before on exam, hard to tell if she ruptured. Nothing collecting on a pad here now. Small return of fluid with AROM attempt, clear. Asking about pitocin due to minimal change, not needing to start it now but just curious when we could consider that. Discussed in 2-4 hours if still unchanged would recommend starting it. She feels good about that time line. Family present at bedside and supportive.   Discussed baby is having decelerations after she has long contractions. She randomly will have a 3-4 minute contraction and then either has a prolonged deceleration that starts during the contractions or following the contraction. The last two were an 1 hour and 15 minutes apart. The prolonged decelerations last about 3 minutes. In between baby is category 1 tracing with moderate variability and contractions. Will continue to monitor closely.     O:  Blood pressure 124/66, pulse 100, temperature 97.6  F (36.4  C), temperature source Oral, resp. rate 17, height 1.702 m (5' 7\"), weight 70.8 kg (156 lb), last menstrual period 10/19/2023, SpO2 98%, not currently breastfeeding.  Patient Vitals for the past 24 hrs:   BP Temp Temp src Pulse Resp SpO2 Height Weight   07/28/24 0025 124/66 -- -- -- -- 98 % -- --   07/28/24 0020 119/68 -- -- -- -- 100 % -- --   07/28/24 0015 113/59 -- -- -- -- 99 % -- --   07/28/24 0010 106/71 -- -- -- -- 99 % -- --   07/28/24 0005 (!) 117/90 -- -- -- -- 99 % -- --   07/28/24 0000 108/63 -- -- -- -- 97 % -- --   07/27/24 2355 106/57 -- -- -- -- 98 % -- --   07/27/24 2350 106/65 -- -- -- -- 97 % -- --   07/27/24 2345 115/64 -- -- -- -- 98 % -- --   07/27/24 2340 111/57 97.6  F (36.4  C) Oral -- -- 97 % -- --   07/27/24 2335 102/55 -- -- -- -- 97 % -- --   07/27/24 2331 115/60 -- -- -- -- 98 % -- --   07/27/24 2330 115/60 -- -- -- -- 98 % -- --   07/27/24 2310 128/71 -- -- " "-- -- 98 % -- --   24 109/59 -- -- -- -- -- -- --   24 -- 97.9  F (36.6  C) Oral 100 17 -- 1.702 m (5' 7\") 70.8 kg (156 lb)     General appearance: comfortable    CONTRACTIONS: Contractions every 2-5 minutes.  Palpate: moderate  FETAL HEART TONES: baseline 120 with moderate FHR variability and    accelerations yes. Decelerations yes, random prolonged decelerations, see notes above.    NST: REACTIVE  ROM: clear fluid  PELVIC EXAM: PELVIC EXAM: / Mid/ soft/ -2  Fetal Position: cephalic   Bloody show: No  Pitocin- none,  Antibiotics- none  Cervical ripening: N/A    ASSESSMENT:  ==============  IUP @ 40w3d early labor   Fetal Heart rate tracing category two with moderate variability and accelerations   GBS- negative     PLAN:  ===========  Comfort measures prn   Pain medication with epidural   Anticipate   Labor augmentation with Pitocin if needed   Reevaluate in 2-4 hours/PRN     Medically Ready for Discharge: Anticipated in 2-4 Days    RUBEN Gifford CNM    "

## 2024-07-28 NOTE — PLAN OF CARE
Goal Outcome Evaluation: Stable      Plan of Care Reviewed With: patient, spouse, family    Overall Patient Progress: improvingOverall Patient Progress: improving         Patient arrived to [facility] unit via wheelchair at 1200 ,with belongings, accompanied by family, with infant in arms. Received report from  CINDY Montano  and checked bands. Unit and room orientation started. Call light given and within arms reach; no concerns present at this time. Continue with plan of care.

## 2024-07-28 NOTE — L&D DELIVERY NOTE
Delivery Summary  Patient reports she started having contractions yesterday morning around 6:00am. [facility] last evening. She received an epidural for pain management and AROM and pitocin for labor augmentation. She made slow progress overnight but then started making change this morning. Initial cervical exam was 8cm with a thick anterior lip. She was repositioned to exaggerated right lateral with peanut ball and soon started to feel rectal pressure. She pushed effectively over a few contractions and the head was delivered at the end of a contraction. There was a delay and assessment revealed transverse shoulders. With the next contraction baby came easily and was placed immediately on Polly's abdomen. Cord clamped and cut after two minutes. Placenta delivered intact, 3 vessel cord. Perineum intact. 300ml in blue drape with nothing to weigh. At the completion of the birth, Polly and Jayy, as well as family members, were all enjoying meeting baby and calling family to give them the news. It was an honor to attend this beautiful birth! Sudha Hallman, SHIRLEY      Polly Maryam Kent MRN# [id number]   Age: 33 year old YOB: 1990     ASSESSMENT & PLAN: Routine PP cares       Michelle Kent [[id number]]      Labor Event Times      Latent labor onset date/time: 2024 1900    Dilation complete date: 24 Complete time:  9:38 AM   Start pushing date/time: 2024 0938          Labor Length      2nd Stage (hrs): 0 (min): 5   3rd Stage (hrs): 0 (min): 7          Labor Events     labor?: No   steroids: None  Labor Type: Spontaneous, AROM  Predominate monitoring during 1st stage: intermittent auscultation, continuous electronic fetal monitoring     Antibiotics received during labor?: No       Rupture date/time: 24 0127   Rupture type: Artificial Rupture of Membranes  Fluid color: Clear, Bloody  Fluid odor: Normal     Augmentation:  AROM  Indications for augmentation: Ineffective Contraction Pattern       Delivery/Placenta Date and Time      Delivery Date: 24 Delivery Time:  9:43 AM   Placenta Date/Time: 2024  9:50 AM  Oxytocin given at the time of delivery: after delivery of baby  Delivering clinician: Sudha Hallman APRN CNM   Other personnel present at delivery:  Provider Role   Charmaine Menon RN Registered Nurse   Cristal Clark RN Registered Nurse             Vaginal Counts       Initial count performed by 2 team members:  Two Team Members   CINDY Montano CNM         Needles Suture Needles Sponges (RETIRED) Instruments   Initial counts 2  5    Added to count 0 0 0    Relief counts       Final counts 2 0 5            Placed during labor Accounted for at the end of labor   FSE No NA   IUPC No NA   Cervidil No NA                  Final count performed by 2 team members:  Two Team Members   CNIDY Montano CNM      Final count correct?: Yes  Pre-Birth Team Brief: Complete       Apgars    Living status: Living   1 Minute 5 Minute 10 Minute 15 Minute 20 Minute   Skin color: 0  1       Heart rate: 2  2       Reflex irritability: 2  2       Muscle tone: 2  2       Respiratory effort: 1  2       Total: 7  9       Apgars assigned by: CAYETANO CLARK RN       Cord      Vessels: 3 Vessels    Cord Complications: None               Gases Sent?: No    Delayed cord clamping?: Yes    Cord Clamping Delay (seconds):  seconds    Stem cell collection?: No           [place] Resuscitation    Methods: None   Care at Delivery: Infant delivered and straight to maternal abdomen.  Initially no cry, dried with warm blankets and bulb syringe and weak cry by one minute.  Continued to support skin to skin as baby transitioned, lusty cry by 2 min with central pink color.  Plan to continue with routine  cares.       Labor Events and Shoulder Dystocia    Fetal Tracing Prior to Delivery: Category 1  Shoulder dystocia present?:  Neg       Delivery (Maternal) (Provider to Complete) ([id number])    Episiotomy: None  Perineal lacerations: None    Repair suture: None  Genital tract inspection done: Pos       Blood Loss  Mother: Polly Kent #[id number]     Start of Mother's Information      Delivery Blood Loss  07/27/24 2143 - 07/28/24 1020      Delivery QBL (mL) Hospital Encounter 300 mL    Total  300 mL               End of Mother's Information  Mother: Polly Kent #[id number]                Delivery - Provider to Complete ([id number])    Delivering clinician: Sudha Hallman APRN CNM  Delivery Type (Choose the 1 that will go to the Birth History): Vaginal, Spontaneous                         Other personnel:  Provider Role   Charmaine Menon, RN Registered Nurse   Cristal Leung RN Registered Nurse                    Placenta    Date/Time: 7/28/2024  9:50 AM  Removal: Spontaneous  Comments: intact  Disposition: Hospital disposal             Anesthesia    Method: Epidural  Cervical dilation at placement: 0-3                    Presentation and Position    Presentation: Vertex    Position: Middle Occiput Anterior                     RUBEN Muniz CNM

## 2024-07-28 NOTE — ANESTHESIA PREPROCEDURE EVALUATION
Anesthesia Pre-Procedure Evaluation    Patient: Polly Kent   MRN: [id number] : 1990        Procedure :           Past Medical History:   Diagnosis Date    Abnormal Pap smear of cervix     Anemia     hx with previous child, none now    Carrier of group B Streptococcus     HSIL on Pap smear 2012    colp:VICKIE II & III referred to gyn/onc    Infertility, female     Varicella       Past Surgical History:   Procedure Laterality Date    FOOT SURGERY Left 2017    see [facility] notes from [facility]      No Known Allergies   Social History     Tobacco Use    Smoking status: Former     Types: Cigarettes     Passive exposure: Never    Smokeless tobacco: Never   Substance Use Topics    Alcohol use: No      Wt Readings from Last 1 Encounters:   24 70.8 kg (156 lb)        Anesthesia Evaluation   Pt has had prior anesthetic. Type: OB Labor Epidural.    No history of anesthetic complications       ROS/MED HX  ENT/Pulmonary:  - neg pulmonary ROS  (-) asthma   Neurologic:  - neg neurologic ROS     Cardiovascular:  - neg cardiovascular ROS  (-) PIH   METS/Exercise Tolerance: >4 METS    Hematologic:     (+)  no anticoagulation therapy,  no thrombocytopenia,  anemia,          Musculoskeletal:   (+)         lumbar spine      GI/Hepatic:     (+) GERD,                   Renal/Genitourinary:       Endo:  - neg endo ROS  (-) obesity   Psychiatric/Substance Use:       Infectious Disease:       Malignancy:       Other:     (-) previous        Physical Exam    Airway        Mallampati: I   TM distance: > 3 FB   Neck ROM: full   Mouth opening: > 3 cm    Respiratory Devices and Support         Dental     Comment: braces    (+) Minor Abnormalities - some fillings, tiny chips      Cardiovascular   cardiovascular exam normal          Pulmonary   pulmonary exam normal                OUTSIDE LABS:  CBC:   Lab Results   Component Value Date    WBC 10.0 2024    WBC 7.8 2024    HGB 10.4 (L) 2024    HGB  "10.8 (L) 2024    HGB 10.8 (L) 2024    HCT 30.8 (L) 2024    HCT 33.1 (L) 2024     2024     2024     BMP:   Lab Results   Component Value Date     10/14/2014    POTASSIUM 3.4 10/14/2014    CHLORIDE 105 10/14/2014    CO2 25 10/14/2014    BUN 8 10/14/2014    CR 0.77 10/14/2014    GLC 82 10/14/2014     COAGS: No results found for: \"PTT\", \"INR\", \"FIBR\"  POC:   Lab Results   Component Value Date    HCG pos 2016     HEPATIC: No results found for: \"ALBUMIN\", \"PROTTOTAL\", \"ALT\", \"AST\", \"GGT\", \"ALKPHOS\", \"BILITOTAL\", \"BILIDIRECT\", \"BAKARI\"  OTHER:   Lab Results   Component Value Date    DEYSI 8.7 10/14/2014       Anesthesia Plan    ASA Status:  2    NPO Status:  ELEVATED Aspiration Risk/Unknown    Anesthesia Type: Epidural.   Induction: N/a.   Maintenance: N/A.        Consents    Anesthesia Plan(s) and associated risks, benefits, and realistic alternatives discussed. Questions answered and patient/representative(s) expressed understanding.     - Discussed: Risks, Benefits and Alternatives for BOTH SEDATION and the PROCEDURE were discussed     - Discussed with:  Patient      - Extended Intubation/Ventilatory Support Discussed: No.      - Patient is DNR/DNI Status: No     Use of blood products discussed: Yes.     - Discussed with: Patient.     - Consented: consented to blood products     Postoperative Care            Comments:    Other Comments: Polly Kent is a 33 year old F presenting in spontaneous labor, requesting an epidural for labor analgesia. Her past obstetric history includes 3 prior , hx of shoulder dystocia and multiple spontaneous miscarriages.  PMHx significant for anemia. Plan for DPE with standard ASA monitors. We discussed the risks and benefits of neuraxial analgesia and/or anesthesia including but not limited to: post dural puncture headache, infection, epidural hematoma, damage to nearby nerves/structures, failed or patchy epidural " requiring replacement or conversion to general anesthesia in an emergent setting. Polly Kent verbalized understanding of these risks. All questions were answered. She would like to proceed with the procedure.                Laura Rosa MD    I have reviewed the pertinent notes and labs in the chart from the past 30 days and (re)examined the patient.  Any updates or changes from those notes are reflected in this note.

## 2024-07-28 NOTE — ANESTHESIA PROCEDURE NOTES
Dural puncture epidural Procedure Note    Pre-Procedure   Staff -        Anesthesiologist:  Doris Gunter MD       Resident/Fellow: Laura Rosa MD       Performed By: with residents and resident       Procedure performed by resident/fellow/CRNA in presence of a teaching physician.         Location: OB       Procedure Start/Stop Times: 7/27/2024 11:12 PM and 7/27/2024 11:24 PM       Pre-Anesthestic Checklist: patient identified, IV checked, risks and benefits discussed, informed consent, monitors and equipment checked, pre-op evaluation, at physician/surgeon's request and post-op pain management  Timeout:       Correct Patient: Yes        Correct Procedure: Yes        Correct Site: Yes        Correct Position: Yes   Procedure Documentation  Procedure: dural puncture epidural       Patient Position: sitting       Skin prep: Chloraprep       Local skin infiltrated with mL of 1% lidocaine.        Insertion Site: L2-3. (midline approach).       Technique: LORT saline        GARBIEL at 5 cm.       Needle Type: [employer]       Needle Gauge: 17.        Needle Length (Inches): 3.5        Spinal Needle Type: Pencan       Spinal Needle (gauge): 27        Spinal Needle Length (inches): 3.5       Catheter: 19 G.          Catheter threaded easily.         5 cm epidural space.         Threaded 10 cm at skin.         # of attempts: 1 and  # of redirects:  1    Assessment/Narrative         Paresthesias: No.       Test dose of 3 mL at 23:21 CDT.         Test dose negative, 3 minutes after injection, for signs of intravascular, subdural, or intrathecal injection.       Insertion/Infusion Method: LORT saline       Aspiration negative for Heme or CSF via Epidural Catheter.       CSF fluid: with Spinal needle.CSF fluid removed: with Epidural needle - not with Epidural needle.    Medication(s) Administered   Medication Administration Time: 7/27/2024 11:12 PM      FOR [facility] ONLY:   Pain Team Contact information: please  "page the Pain Team Via [facility]. Search \"Pain\". During daytime hours, please page the attending first. At night please page the resident first.      "

## 2024-07-28 NOTE — PROGRESS NOTES
Data: Polly Kent transferred to [id number] via wheelchair at 1200. Baby transferred via parent's arms.  Action: Receiving unit notified of transfer: Yes. Patient and family notified of room change. Report given to CINDY Fairbanks at 1200. Belongings sent to receiving unit. Accompanied by Registered Nurse. Oriented patient to surroundings. Call light within reach. ID bands double-checked with receiving RN.  Response: Patient tolerated transfer and is stable.

## 2024-07-28 NOTE — PROGRESS NOTES
"S: Polly is sleeping, did not wake her. Epidural catheter was broken off so anesthesia needed to fix that, able to keep the same epidural though. Pitocin was delayed until epidural started working again. Started about 0530.     O:  Blood pressure 109/68, pulse 100, temperature 97.7  F (36.5  C), temperature source Oral, resp. rate 18, height 1.702 m (5' 7\"), weight 70.8 kg (156 lb), last menstrual period 10/19/2023, SpO2 96%, not currently breastfeeding.  Patient Vitals for the past 24 hrs:   BP Temp Temp src Pulse Resp SpO2 Height Weight   07/28/24 0630 109/68 -- -- -- -- 96 % -- --   07/28/24 0615 110/71 -- -- -- -- 96 % -- --   07/28/24 0545 132/69 -- -- -- -- 93 % -- --   07/28/24 0530 122/68 -- -- -- -- 97 % -- --   07/28/24 0515 115/60 -- -- -- -- 96 % -- --   07/28/24 0500 118/68 -- -- -- -- 96 % -- --   07/28/24 0445 121/67 -- -- -- -- 99 % -- --   07/28/24 0430 120/75 -- -- -- -- 100 % -- --   07/28/24 0415 111/62 97.7  F (36.5  C) Oral -- 18 99 % -- --   07/28/24 0345 107/68 -- -- -- -- 98 % -- --   07/28/24 0330 116/67 -- -- -- -- 99 % -- --   07/28/24 0315 116/74 -- -- -- -- 96 % -- --   07/28/24 0300 117/73 -- -- -- -- 98 % -- --   07/28/24 0245 117/70 -- -- -- -- 97 % -- --   07/28/24 0230 122/72 -- -- -- -- 99 % -- --   07/28/24 0215 122/64 -- -- -- -- 97 % -- --   07/28/24 0200 116/69 -- -- -- -- 99 % -- --   07/28/24 0145 113/62 -- -- -- -- 99 % -- --   07/28/24 0130 108/57 -- -- -- -- 100 % -- --   07/28/24 0115 109/68 -- -- -- -- 100 % -- --   07/28/24 0100 115/62 -- -- -- -- 98 % -- --   07/28/24 0045 111/64 -- -- -- -- 98 % -- --   07/28/24 0030 -- -- -- -- -- 99 % -- --   07/28/24 0025 124/66 -- -- -- -- 98 % -- --   07/28/24 0020 119/68 -- -- -- -- 100 % -- --   07/28/24 0015 113/59 -- -- -- -- 99 % -- --   07/28/24 0010 106/71 -- -- -- -- 99 % -- --   07/28/24 0005 (!) 117/90 -- -- -- -- 99 % -- --   07/28/24 0000 108/63 -- -- -- -- 97 % -- --   07/27/24 2355 106/57 -- -- -- -- 98 % -- -- " "  24 106/65 -- -- -- -- 97 % -- --   24 115/64 -- -- -- -- 98 % -- --   24 111/57 97.6  F (36.4  C) Oral -- -- 97 % -- --   24 102/55 -- -- -- -- 97 % -- --   24 115/60 -- -- -- -- 98 % -- --   24 115/60 -- -- -- -- 98 % -- --   24 128/71 -- -- -- -- 98 % -- --   24 109/59 -- -- -- -- -- -- --   24 -- 97.9  F (36.6  C) Oral 100 17 -- 1.702 m (5' 7\") 70.8 kg (156 lb)     General appearance: comfortable    CONTRACTIONS: Contractions every 2-4 minutes.  Palpate: moderate  FETAL HEART TONES: baseline 115 with moderate FHR variability and    accelerations yes. Decelerations yes, occasional variable with moderate variability and accelerations between.    NST: REACTIVE  ROM: clear fluid  PELVIC EXAM: Deferred  Fetal Position: cephalic   Bloody show: Yes   Pitocin- 6 mu/min.,  Antibiotics- none  Cervical ripening: N/A    ASSESSMENT:  ==============  IUP @ 40w3d early labor   Fetal Heart rate tracing category two  GBS- negative     PLAN:  ===========  Comfort measures prn   Pain medication with epidural   Anticipate   Labor induction with Pitocin  Reevaluate in 2-4 hours/PRN     Medically Ready for Discharge: Anticipated in 2-4 Days    RUBEN Gifford CNM    "

## 2024-07-28 NOTE — PLAN OF CARE
Goal Outcome Evaluation:    Pt admitted to labor and delivery for onset of labor, pt rates contractions4-5/10. SVE by CNM 3/80/-2 on admission. Pt has history of precipitous deliveries, discussed a plan with CNM and decided to be admitted. Pt contractions got closer together and pt requested an epidural. Pt is now relaxing comfortably in bed.

## 2024-07-28 NOTE — PROVIDER NOTIFICATION
07/27/24 2045   Provider Notification   Provider Name/Title Sandy Glez CNM   Method of Notification Electronic Page   Notification Reason Patient Arrived     Provider notified of pt arrival to triage for rule out labor. Notified of deceleration to review EFM . CNM replied she will come bedside and check the patient.

## 2024-07-28 NOTE — H&P
ADMIT NOTE  =================  40w2d  Polly Kent is a 33 year old female     with an Patient's last menstrual period was 10/19/2023. and Estimated Date of Delivery: 2024 is [facility] on 2024 at 9:16 PM in in early labor.   Fetal movement- active  ROM- no   GBS- negative    HPI  ================  Polly called with early labor. She started having contractions this morning about 6 am. Throughout the day the contractions have picked up in both intensity and frequency. When she called they were about every 15 minutes. She reported her last labor went really fast, just about an hour after they picked up to delivery so recommended she come in. Upon arrival she is still ingrid well. She reports they are still spaced some and she thought they would be a little stronger by now. She is having good fetal movement. Bloody show/mucus plug coming out. No water leaking. Hoping to get an epidural. No other questions or concerns at this time.   FOB- is involved, Aarion  Other labor support- his daughter and her youngest daughter here.     PRENATAL COURSE  =================  Prenatal course was complicated by    Patient Active Problem List    Diagnosis Date Noted    History of shoulder dystocia in prior pregnancy 10/11/2011     Priority: High     8/25/10, noted 1 minute dystocia, baby delivered with Joelle and suprapubic pressure      Supervision of other normal pregnancy, antepartum 2024     Priority: Medium    Encounter for triage in pregnant patient 2024     Priority: Medium    Labor and delivery, indication for care 2024     Priority: Medium    Unilateral inguinal hernia without obstruction or gangrene, recurrence not specified 2024     Priority: Medium    History of multiple miscarriages 2024     Priority: Medium    Need for Tdap vaccination 2024     Priority: Medium    Injury of quadriceps muscle 2022     Priority: Medium    History  of anemia 2021     Priority: Medium    Menorrhagia with irregular cycle 2021     Priority: Medium    Crush injury, leg, lower, left, initial encounter 2017     Priority: Medium    Bacterial vaginosis 2012     Priority: Medium     12: Currently treating for BV, [place] [facility] [place].      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 VICKIE 2/3- [place] not done  Referred to Gyn Onc for VICKIE 2/3  12- Gyn Oncology consult- colp done, no HPV testing, no biopsies.              Plan: repeat colp in 8-10 wks, scheduled for 10/2/12, reminder sent. If stable, Pap and colp postpartum  10/2/12- Oncology consult- colp unchanged, FU with onc 6 weeks postpartum  13: No showed x 3 with gyn onc, x 2 with us. Lost to pap tracking  24 ASCUS, Neg HPV, pregnant. Plan [place] bef 24 / notified  4/15/24 [place]: ASSESSMENT: Normal exam without visible pathology. PLAN: plan repeat pap smear one year with HPV. Discussed importance of follow-up when she is NOT pregnant. Abnormal pap was >10 yrs ago.           HISTORIES  ============  No Known Allergies  Past Medical History:   Diagnosis Date    Abnormal Pap smear of cervix     Anemia     hx with previous child, none now    Carrier of group B Streptococcus     HSIL on Pap smear 2012    colp:VICKIE II & III referred to gyn/onc    Infertility, female     Varicella      Past Surgical History:   Procedure Laterality Date    FOOT SURGERY Left     [place] [facility] [place] [facility]   [place]  Family History   Problem Relation Age of Onset    Family History Negative Mother     No Known Problems Father     Cancer Paternal Grandmother         leukemia     Social History     Tobacco Use    Smoking status: Former     Types: Cigarettes     Passive exposure: Never    Smokeless tobacco: Never   Substance Use Topics    Alcohol use: No     OB History    Para Term  AB Living   12 3 3 0 8 3   SAB IAB Ectopic Multiple Live  "Births   4 4 0 0 3      # Outcome Date GA Lbr Asa/2nd Weight Sex Type Anes PTL Lv   12 Current            11 Term 10/30/19 41w1d  3.6 kg (7 lb 15 oz) F Vag-Spont   CAMPBELL      Name: BG MANISHA CARREON      Apgar1: 9  Apgar5: 9   10 IAB 01/01/19           9 IAB 01/01/17           8 IAB 01/01/16           7 IAB 01/01/15           6 SAB 01/01/14           5 SAB 01/01/13           4 Term 12/05/12 39w0d 05:35 / 00:27 2.58 kg (5 lb 11 oz) M Vag-Spont EPI N CAMPBELL      Name: MARIANA CARREON      Apgar1: 9  Apgar5: 9   3 SAB 12/01/11           2 SAB 11/22/11              Birth Comments: No D&C   1 Term 08/25/10 40w3d  3.544 kg (7 lb 13 oz) F Vag-Spont EPI N [place]AMPBELL      Birth Comments: hx of 1 min shoulder dystocia      Apgar1: 6  Apgar5: 9        LABS:   ===========  Rhogam not indicated  O pos    Lab Results   Component Value Date    RUBELLAABIGG 147 06/05/2012      Anti-Treponemia: non-reactive  Lab Results   Component Value Date    HIV Negative 06/05/2012     Lab Results   Component Value Date    HGB 10.8 06/27/2024    HGB 10.8 06/27/2024    HGB 11.1 01/04/2016      Lab Results   Component Value Date    HEPBANG Nonreactive 01/25/2024    HEPBANG Negative 06/05/2012     Lab Results   Component Value Date    GBS  11/20/2012     Negative: No GBS DNA detected, presumed negative for GBS or number of bacteria   may be below the limit of detection of the assay.   Assay performed on incubated broth culture of specimen using Cepheid   SmartCycler(R) real-time PCR.     Hepatitis C: non-reactive    1 hr GCT= 75    other labs- none     ROS  =========  Pt denies significant respiratory, cardiovacular, GI, or muscular/skeletalcomplaints.    See RN data base ROS.     PHYSICAL EXAM:  ===============  Blood pressure 109/59, pulse 100, temperature 97.9  F (36.6  C), temperature source Oral, resp. rate 17, height 1.702 m (5' 7\"), weight 70.8 kg (156 lb), last menstrual period 10/19/2023, not currently breastfeeding.  Patient Vitals for the past 24 hrs:   " "BP Temp Temp src Pulse Resp Height Weight   24 109/59 -- -- -- -- -- --   24 -- 97.9  F (36.6  C) Oral 100 17 1.702 m (5' 7\") 70.8 kg (156 lb)     General appearance: comfortable  Heart: RRR without murmur  Lungs: clear to auscultation   Neuro: denies headache and visual disturbances  Psych: Mentation normal and bright   Legs: 2+/2+, no clonus, no edema      Abdomen: gravid, single vertex fetus, non-tender between contractions.  EFW-  7 lbs.   CONTACTIONS: Contractions every 2-10 minutes.  Palpate: moderate  FETAL HEART TONES: baseline 125 with moderate FHR variability and  pos accelerations. One decelerations present. Followed by category 1 tracing for 30 minutes.       PELVIC EXAM: 3/ Mid/ soft/ -2   ROOT SCORE: 9  BLOODY SHOW: no   ROM: No  FLUID: none  ROM Plus: not done    ASSESSMENT:  ==============  IUP @ 40w2d in in early labor   NST REACTIVE  Fetal Heart rate tracing category one  GBS- negative     PLAN:  ===========  Admit - see IP orders  Pain medication with epidural, not ready now but plans to have one eventually  Anticipate    We discussed that since she is post her due date, has a history of pretty fast labors, and making change since her last cervical check would recommend admission. She would like to walk around. We discussed potential for AROM and she asked about pitocin as well. Planning an epidural. Discussed okay to be off monitor and walk with checking in at least every hour. Overall baby is category 1 with moderate variability and accelerations, 1 variable over an hour with lots of movement from baby. Will move to continuous monitoring when she is done walking or if during check ins, baby has more decelerations. She feels good about this plan. No other questions.     Sandy Glez, RUBEN WATSONM      "

## 2024-07-28 NOTE — PROGRESS NOTES
"S: Polly is doing well. Was able to walk around for about an hour and contractions have picked up for her. She reports they are about every 5-7 minutes and getting stronger. Ready to see anesthesia to sign the consent for an epidural but not ready for placement yet. Jayy brought the girls home and will return. No questions or concerns at this time.     O:  Blood pressure 109/59, pulse 100, temperature 97.9  F (36.6  C), temperature source Oral, resp. rate 17, height 1.702 m (5' 7\"), weight 70.8 kg (156 lb), last menstrual period 10/19/2023, not currently breastfeeding.  Patient Vitals for the past 24 hrs:   BP Temp Temp src Pulse Resp Height Weight   24 109/59 -- -- -- -- -- --   24 -- 97.9  F (36.6  C) Oral 100 17 1.702 m (5' 7\") 70.8 kg (156 lb)     General appearance: getting uncomfortable with contractions but coping well     CONTRACTIONS: Contractions every 5-7 minutes.  Palpate: moderate  FETAL HEART TONES: baseline 130 with moderate FHR variability and    accelerations yes. Decelerations no.    NST: REACTIVE  ROM: not ruptured  PELVIC EXAM: deferred  Fetal Position: cephalic   Bloody show: No  Pitocin- none,  Antibiotics- none  Cervical ripening: N/A    ASSESSMENT:  ==============  IUP @ 40w2d early labor   Fetal Heart rate tracing category one  GBS- negative     PLAN:  ===========  Comfort measures prn   Pain medication with epidural when ready   Anticipate   Reevaluate in 2-4 hours/PRN     Medically Ready for Discharge: Anticipated in 2-4 Days    RUBEN Gifford CNM    "

## 2024-07-28 NOTE — PLAN OF CARE
Polly has  at 0943 with RICH Haley at bedside. Baby skin to skin immediately after birth. Postpartum pitocin started after delivery of baby. Placenta delivered at 0950. Perineum intact. Perineal care provided and ice pack applied. Fundus firm without massage and midline, scant bleeding. VS WNL. Mother and baby stable.

## 2024-07-28 NOTE — PROGRESS NOTES
"S: Polly is doing well. Comfortable with epidural. Giving Nubain for itching which is working well. Able to get some rest. Would like a cervical check, cervix 5/90/-2 clear fluid. Discussed option of expectant management since she is making changes versus pitocin augmentation. She would prefer pitocin. Discussed risks, benefits, and what to expect afterwards. No other questions or concerns at this time.     O:  Blood pressure 108/57, pulse 100, temperature 97.6  F (36.4  C), temperature source Oral, resp. rate 17, height 1.702 m (5' 7\"), weight 70.8 kg (156 lb), last menstrual period 10/19/2023, SpO2 100%, not currently breastfeeding.  Patient Vitals for the past 24 hrs:   BP Temp Temp src Pulse Resp SpO2 Height Weight   07/28/24 0130 108/57 -- -- -- -- 100 % -- --   07/28/24 0025 124/66 -- -- -- -- 98 % -- --   07/28/24 0020 119/68 -- -- -- -- 100 % -- --   07/28/24 0015 113/59 -- -- -- -- 99 % -- --   07/28/24 0010 106/71 -- -- -- -- 99 % -- --   07/28/24 0005 (!) 117/90 -- -- -- -- 99 % -- --   07/28/24 0000 108/63 -- -- -- -- 97 % -- --   07/27/24 2355 106/57 -- -- -- -- 98 % -- --   07/27/24 2350 106/65 -- -- -- -- 97 % -- --   07/27/24 2345 115/64 -- -- -- -- 98 % -- --   07/27/24 2340 111/57 97.6  F (36.4  C) Oral -- -- 97 % -- --   07/27/24 2335 102/55 -- -- -- -- 97 % -- --   07/27/24 2331 115/60 -- -- -- -- 98 % -- --   07/27/24 2330 115/60 -- -- -- -- 98 % -- --   07/27/24 2310 128/71 -- -- -- -- 98 % -- --   07/27/24 2023 109/59 -- -- -- -- -- -- --   07/27/24 2021 -- 97.9  F (36.6  C) Oral 100 17 -- 1.702 m (5' 7\") 70.8 kg (156 lb)     General appearance: comfortable    CONTRACTIONS: Contractions every 2-4 minutes.  Palpate: moderate  FETAL HEART TONES: baseline 115 with moderate FHR variability and    accelerations yes. Decelerations no. Between last note and this note did have some early decelerations and variable. They are non-recurrent. Between continues to be moderate variability and accelerations. "   NST: REACTIVE  ROM: clear fluid  PELVIC EXAM: PELVIC EXAM: 5/ 90/ Mid/ soft/ -2   Fetal Position: cephalic, OP position    Bloody show: Yes   Pitocin- none,  Antibiotics- none  Cervical ripening: N/A    ASSESSMENT:  ==============  IUP @ 40w3d early labor   Fetal Heart rate tracing category one  GBS- negative     PLAN:  ===========  Comfort measures prn   Pain medication with epidural   Anticipate   Labor induction with Pitocin  Reevaluate in 2-4 hours/PRN     Medically Ready for Discharge: Anticipated in 2-4 Days    RUBEN Gifford CNM

## 2024-07-28 NOTE — PROGRESS NOTES
Report received and care assumed from CINDY Ramirez at 0720. Patient up in bed in semi ansari's position, comfortable with epidural. Support persons mother, grand-mother and partner at bedside.    Safety checks completed. Assessments completed. C-EFM, as per flowsheet. Patient repositioned. Patient requested to be checked to know how far she is. Provider Sudha Hallman notified at [phone].

## 2024-07-29 VITALS
HEART RATE: 68 BPM | WEIGHT: 150.2 LBS | BODY MASS INDEX: 23.57 KG/M2 | OXYGEN SATURATION: 99 % | HEIGHT: 67 IN | SYSTOLIC BLOOD PRESSURE: 111 MMHG | DIASTOLIC BLOOD PRESSURE: 79 MMHG | RESPIRATION RATE: 16 BRPM | TEMPERATURE: 98.2 F

## 2024-07-29 LAB — HGB BLD-MCNC: 9.2 G/DL (ref 11.7–15.7)

## 2024-07-29 PROCEDURE — 250N000013 HC RX MED GY IP 250 OP 250 PS 637: Performed by: ADVANCED PRACTICE MIDWIFE

## 2024-07-29 PROCEDURE — 36415 COLL VENOUS BLD VENIPUNCTURE: CPT | Performed by: ADVANCED PRACTICE MIDWIFE

## 2024-07-29 PROCEDURE — 85018 HEMOGLOBIN: CPT | Performed by: ADVANCED PRACTICE MIDWIFE

## 2024-07-29 RX ORDER — OXYCODONE HYDROCHLORIDE 5 MG/1
5 TABLET ORAL EVERY 6 HOURS PRN
Status: DISCONTINUED | OUTPATIENT
Start: 2024-07-29 | End: 2024-07-29 | Stop reason: HOSPADM

## 2024-07-29 RX ORDER — NALOXONE HYDROCHLORIDE 0.4 MG/ML
0.2 INJECTION, SOLUTION INTRAMUSCULAR; INTRAVENOUS; SUBCUTANEOUS
Status: DISCONTINUED | OUTPATIENT
Start: 2024-07-29 | End: 2024-07-29 | Stop reason: HOSPADM

## 2024-07-29 RX ORDER — ACETAMINOPHEN 325 MG/1
650 TABLET ORAL EVERY 6 HOURS PRN
Qty: 100 TABLET | Refills: 0 | Status: SHIPPED | OUTPATIENT
Start: 2024-07-29

## 2024-07-29 RX ORDER — IBUPROFEN 600 MG/1
600 TABLET, FILM COATED ORAL EVERY 6 HOURS PRN
Qty: 60 TABLET | Refills: 0 | Status: SHIPPED | OUTPATIENT
Start: 2024-07-29

## 2024-07-29 RX ORDER — NALOXONE HYDROCHLORIDE 0.4 MG/ML
0.4 INJECTION, SOLUTION INTRAMUSCULAR; INTRAVENOUS; SUBCUTANEOUS
Status: DISCONTINUED | OUTPATIENT
Start: 2024-07-29 | End: 2024-07-29 | Stop reason: HOSPADM

## 2024-07-29 RX ORDER — AMOXICILLIN 250 MG
1 CAPSULE ORAL DAILY
Qty: 100 TABLET | Refills: 0 | Status: SHIPPED | OUTPATIENT
Start: 2024-07-29

## 2024-07-29 RX ADMIN — DOCUSATE SODIUM 100 MG: 100 CAPSULE, LIQUID FILLED ORAL at 11:12

## 2024-07-29 RX ADMIN — IBUPROFEN 800 MG: 800 TABLET, FILM COATED ORAL at 11:13

## 2024-07-29 RX ADMIN — IBUPROFEN 800 MG: 800 TABLET, FILM COATED ORAL at 00:18

## 2024-07-29 RX ADMIN — ACETAMINOPHEN 650 MG: 325 TABLET, FILM COATED ORAL at 11:12

## 2024-07-29 RX ADMIN — ACETAMINOPHEN 650 MG: 325 TABLET, FILM COATED ORAL at 04:48

## 2024-07-29 RX ADMIN — OXYCODONE HYDROCHLORIDE 5 MG: 5 TABLET ORAL at 11:13

## 2024-07-29 ASSESSMENT — ACTIVITIES OF DAILY LIVING (ADL)
ADLS_ACUITY_SCORE: 20

## 2024-07-29 NOTE — PLAN OF CARE
Goal Outcome Evaluation:      Plan of Care Reviewed With: patient, spouse    Overall Patient Progress: improvingOverall Patient Progress: improving    Postpartum assessments WDL. VSS. Pain managed okay with Tylenol and Ibuprofen, did request to have PRN oxy available if needed for breakthrough pain. Order placed by provider. Fundus firm, midline and below the U. Scant amounts of lochia with no clots present. Breastfeeding infant on demand. Safe sleep education given. Significant other present at bedside, supportive and involved with cares. Call light within reach. Continue with plan of care.    Sonia Vogt RN

## 2024-07-29 NOTE — LACTATION NOTE
This note was copied from a baby's chart.  Consult for:  Questions about home breast pump and freeze drying breastmilk    Infant Name: Ace    Infant's Primary Care Clinic: [facility]    Delivery Information:  Ace was born at Gestational Age: 40w3d via vaginal delivery on 2024 9:43 AM     Maternal Health History:    Information for the patient's mother:  Polly Kent [[id number]]     Patient Active Problem List   Diagnosis    History of shoulder dystocia in prior pregnancy    Bacterial vaginosis    HSIL on Pap smear of cervix    Crush injury, leg, lower, left, initial encounter    History of anemia    Injury of quadriceps muscle    Menorrhagia with irregular cycle    History of multiple miscarriages    Need for Tdap vaccination    Unilateral inguinal hernia without obstruction or gangrene, recurrence not specified    Supervision of other normal pregnancy, antepartum    Encounter for triage in pregnant patient    Labor and delivery, indication for care     (normal spontaneous vaginal delivery)      Maternal Breast Exam:  Polly noted breast growth and sensitivity in early pregnancy. She denies any history of breast/chest injury or surgery. Her breasts are soft and symmetrical with bilateral intact, everted nipples. She has been able to hand express colostrum. ?    Breastfeeding/ Lactation History: Polly  her other 3 children without issues. She has always had a good milk supply.     Infant information: Ace was AGA at birth and has age appropriate output and weight loss.      Weight Change Since Birth: -5% at 1 day old     Oral exam of baby:  Ace has a normal oral exam and is able to produce a coordinated suck when sucking on my gloved finger.     Feeding History: Polly has been breastfeeding on demand. She has tender nipples but nipples are intact. Reviewed tips/reminders on how to get a deep latch.     Feeding Assessment:  Encouraged to call for support as needd.     Education:    [x] Expected  feeding patterns in the first few days (pg. 38 of Your Guide to To Postpartum and [place] Care)/ the Second Night  [] Stages of milk production  [] Benefits of hand expression of colostrum  [] Early feeding cues     [] Benefits of feeding on cue  [] Benefits of skin to skin  [] Breastfeeding positions  [x] Tips to get and maintain a deep latch  [] Nutritive vs.non-nutritive sucking  [] Gentle breast compressions as needed to enhance milk transfer  [] How to tell when baby is finished  [] How to tell if baby is getting enough  [x] Expected  output  [x] [place] weight loss  [x] Infant Feeding Log  [] [facility] [employer] [facility] Breastfeeding/Pumping videos  [] Signs breastfeeding is going well (comfortable latch, audible swallows, age appropriate output and weight loss)    [] Tips to prevent engorgement  [] Signs of engorgement  [] Tips to manage engorgement  [x] Pumping recommendations   [] [facility] breast pump part/infant feeding supplies cleaning recommendations  [] Inpatient breastfeeding support  [x] Outpatient lactation resources    Handouts: Infant Feeding Log (Week 1, Your Guide to Postpartum &  Care Book) and [employer] Lactation Resources    Home Breast Pump: Polly has a Zomee pump for home use. Reviewed pump with her and flange fit tips. Encouraged her to review the user manual with the pump prior to first use of if she has pump issues.     Plan: Continue breastfeeding on cue with a  goal of 8-12 feedings per day.     Encourage frequent skin to skin and hand expression.     Encouraged Polly to review online reviews and company information related to freeze drying her breastmilk as our department does not have resources available for this.     Encouraged follow up with outpatient lactation consultant  as needed after discharge. [facility] They were also given the [employer] Lactation Resource Handout.        Libra Siddiqui RN, IBCLC   Lactation  Consultant  [place]: [facility] [phone]  Office: [phone]

## 2024-07-29 NOTE — PLAN OF CARE
Vital signs within normal limits. Postpartum checks within normal limits. Patient eating and drinking normally. Patient able to empty bladder independently and is up ambulating. Denies pain.  Patient performing self cares and is able to care for infant. Breastfeeding on cue, Positive attachment behaviors observed with infant. Support person present and very supportive. Will continue to assess/assist as needed.

## 2024-07-29 NOTE — PROGRESS NOTES
"    Anesthesia Post-Partum Follow-Up Note After  with Epidural    Patient: Polly Kent    Patient location: Post-partum floor.    Anesthesia type: Epidural    Subjective:     She does not complain of pruritis at this time. She denies weakness, denies paresthesia, denies difficulties breathing or voiding, denies nausea or vomiting, and denies headache. She is able to ambulate and tolerates regular diet.    Objective:    Respiratory Function (RR / SpO2 / Airway Patency): Satisfactory    Cardiac Function (HR / Rhythm / BP): Satisfactory    Strength and sensation lower extremities: Normal    Site of epidural insertion: No signs of infection or inflammation.     Last Vitals: /79 (BP Location: Right arm, Patient Position: Sitting, Cuff Size: Adult Regular)   Pulse 68   Temp 36.8  C (98.2  F) (Oral)   Resp 16   Ht 1.702 m (5' 7\")   Wt 68.1 kg (150 lb 3.2 oz)   LMP 10/19/2023   SpO2 99%   Breastfeeding Unknown   BMI 23.52 kg/m      Assessment and plan:   Polly Kent is a 33 year old female  post-partum #2. An epidural catheter was successfully inserted at the level of L2-3 without technical challenges. This successfully provided labor analgesia via PCEA pump infusing ropivacaine 0.1% with Fentanyl 2mcg/mL. The patient delivered via  and the epidural catheter was removed immediately thereafter by the L&D RN with the catheter tip intact per chart review.     At this time, there is no evidence of adverse side effects associated with the insertion or removal of the epidural catheter. If the patient develops new lower extremity paresis or paresthesias; or if there are concerns regarding the insertion site of the catheter, please reach out to the Dept of Anesthesia.    Thank you for including us in the care for this patient.    Jah Romero MD   Anesthesiology        "

## 2024-07-29 NOTE — PLAN OF CARE
Goal Outcome Evaluation: VSS. Postpartum checks WDL. Up independently, voiding without difficulty. Pain managed with tylenol, ibuprofen, and oxycodone. Breastfeeding well independently.     Pt discharged to home with family. Discharge instructions given and all questions answered. Discharge medications given and instructed on use. Pt to follow-up with provider in 6 weeks.

## 2024-07-29 NOTE — DISCHARGE SUMMARY
[facility]    Discharge Summary  Obstetrics    Date of Admission:  2024  Date of Discharge:  2024  Discharging Provider: VARUN Horn    Discharge Diagnoses   (O80)  (normal spontaneous vaginal delivery)  (primary encounter diagnosis)  Plan: Breast pump - Manual/Electric, acetaminophen         (TYLENOL) 325 MG tablet, ibuprofen         (ADVIL/MOTRIN) 600 MG tablet, senna-docusate         (SENOKOT-S/PERICOLACE) 8.6-50 MG tablet,         Postpartum Home Care Referral    History of Present Illness   Polly Kent is now a  33 year old female who presented in early labor on , received pitocin and AROM for labor augmentation and delivered a baby boy at 0943 on  via .     Hospital Course   The patient's hospital course was unremarkable.  She recovered as anticipated and experienced no post-delivery complications. On discharge, her pain was well controlled on tylenol, ibuprofen, and oxycodone. Polly does not wish to continue oxycodone outpatient, Rx for ibuprofen and tylenol given. Vaginal bleeding is similar to heavy menstrual flow. Voiding without difficulty. Ambulating well and tolerating a normal diet. No fevers. Having some nipple pain with cluster feeds- lactation RN visit before discharge. Infant is stable. She was discharged on post-partum day #1.    Post-partum hemoglobin:   Hemoglobin   Date Value Ref Range Status   2024 9.2 (L) 11.7 - 15.7 g/dL Final   2016 11.1 (L) 11.7 - 15.7 g/dL Final       [place] Depression Scale  Thoughts of Harming Self: Never  Total Score: 2      Discharge Disposition   Discharged to home   Condition at discharge: Stable  Discussed continuing iron, B6, vitamin C supplementation to increase hemoglobin.   Reviewed contraception methods safe with breastfeeding including hormonal and copper IUDs, nexplanon, depo, and mini pill. She is currently considering Mirena IUD for bleeding  benefits, but will consider her options before her postpartum visit.  Postpartum education given including bleeding, clots, infection, mental health conditions, and pre-eclampsia as well as RN home visit and 6 week postpartum follow-up visit.     Primary Care Physician   Physician No Ref-Primary    Consultations This Hospital Stay   ANESTHESIOLOGY IP CONSULT  LACTATION IP CONSULT    Discharge Orders      Breast pump - Manual/Electric    Breast Pump Documentation:  Manual/Electric Pump: To support adequate breast milk production and nutrition for infant.     I, the undersigned, certify that the above prescribed supplies are medically necessary for this patient and is both reasonable and necessary in reference to accepted standards of medical and necessary in reference to accepted standards of medical practice in the treatment of this patient's condition and is not prescribed as a convenience.     Discharge Medications   Current Discharge Medication List        START taking these medications    Details   acetaminophen (TYLENOL) 325 MG tablet Take 2 tablets (650 mg) by mouth every 6 hours as needed for mild pain Start after Delivery.  Qty: 100 tablet, Refills: 0    Associated Diagnoses:  (normal spontaneous vaginal delivery)      ibuprofen (ADVIL/MOTRIN) 600 MG tablet Take 1 tablet (600 mg) by mouth every 6 hours as needed for moderate pain Start after delivery  Qty: 60 tablet, Refills: 0    Associated Diagnoses:  (normal spontaneous vaginal delivery)      senna-docusate (SENOKOT-S/PERICOLACE) 8.6-50 MG tablet Take 1 tablet by mouth daily Start after delivery.  Qty: 100 tablet, Refills: 0    Associated Diagnoses:  (normal spontaneous vaginal delivery)           CONTINUE these medications which have NOT CHANGED    Details   cyanocobalamin (VITAMIN B-12) 1000 MCG tablet Take 1 tablet (1,000 mcg) by mouth daily  Qty: 90 tablet, Refills: 2    Associated Diagnoses: Anemia during pregnancy in third trimester       famotidine (PEPCID) 20 MG tablet Take 1 tablet (20 mg) by mouth 2 times daily  Qty: 30 tablet, Refills: 0    Associated Diagnoses: Nausea and vomiting in pregnancy      ferrous sulfate (FEROSUL) 325 (65 Fe) MG tablet Take 1 tablet (325 mg) by mouth daily (with breakfast)  Qty: 90 tablet, Refills: 2    Associated Diagnoses: Anemia during pregnancy in third trimester      vitamin C (ASCORBIC ACID) 250 MG tablet Take 1 tablet (250 mg) by mouth daily  Qty: 90 tablet, Refills: 2    Associated Diagnoses: Anemia during pregnancy in third trimester           STOP taking these medications       omeprazole (PRILOSEC) 20 MG DR capsule Comments:   Reason for Stopping:         ondansetron (ZOFRAN ODT) 4 MG ODT tab Comments:   Reason for Stopping:             Allergies   No Known Allergies     VARUN Horn    I was present with the CNM student who participated in the service and in the documentation of the services provided. I have verified the history and personally performed the physical exam and medical decision making, as documented by the student and edited by me.     Barbara Sarmiento, RICH, APRN RICH WATSONM

## 2024-07-29 NOTE — DISCHARGE INSTRUCTIONS
Warning Signs after Having a Baby    Keep this paper on your fridge or somewhere else where you can see it.    Call your provider if you have any of these symptoms up to 12 weeks after having your baby.    Thoughts of hurting yourself or your baby  Pain in your chest or trouble breathing  Severe headache not helped by pain medicine  Eyesight concerns (blurry vision, seeing spots or flashes of light, other changes to eyesight)  Fainting, shaking or other signs of a seizure    Call 9-1-1 if you feel that it is an emergency.     The symptoms below can happen to anyone after giving birth. They can be very serious. Call your provider if you have any of these warning signs.    My provider s phone number: _______________________    Losing too much blood (hemorrhage)    Call your provider if you soak through a pad in less than an hour or pass blood clots bigger than a golf ball. These may be signs that you are bleeding too much.    Blood clots in the legs or lungs    After you give birth, your body naturally clots its blood to help prevent blood loss. Sometimes this increased clotting can happen in other areas of the body, like the legs or lungs. This can block your blood flow and be very dangerous.     Call your provider if you:  Have a red, swollen spot on the back of your leg that is warm or painful when you touch it.   Are coughing up blood.     Infection    Call your provider if you have any of these symptoms:  Fever of 100.4 F (38 C) or higher.  Pain or redness around your stitches if you had an incision.   Any yellow, white, or green fluid coming from places where you had stitches or surgery.    Mood Problems (postpartum depression)    Many people feel sad or have mood changes after having a baby. But for some people, these mood swings are worse.     Call your provider right away if you feel so anxious or nervous that you can't care for yourself or your baby.    Preeclampsia (high blood pressure)    Even if you  "didn't have high blood pressure when you were pregnant, you are at risk for the high blood pressure disease called preeclampsia. This risk can last up to 12 weeks after giving birth.     Call your provider if you have:   Pain on your right side under your rib cage  Sudden swelling in the hands and face    Remember: You know your body. If something doesn't feel right, get medical help.     For informational purposes only. Not to replace the advice of your health care provider. Copyright 2020 [place] [employer] [facility]. All rights reserved. Clinically reviewed by Glenis Gama, RNC-OB, MSN. i[employer] [id number] - Rev .    Postpartum Care at Home With Your Baby: Care Instructions  Overview     After childbirth (postpartum period), your body goes through many changes as you recover. In these weeks after delivery, try to take good care of yourself. Get rest whenever you can and accept help from others.  It may take 4 to 6 weeks to feel like yourself again, and possibly longer if you had a  birth. You may feel sore or very tired as you recover. After delivery, you may continue to have contractions as the uterus returns to the size it was before your pregnancy. You will also have some vaginal bleeding. And you may have pain around the vagina as you heal. Several days after delivery you may also have pain and swelling in your breasts as they fill with milk. There are things you can do at home to help ease these discomforts.  After childbirth, it's common to feel emotional. You may feel irritable, cry easily, and feel happy one minute and sad the next. This is called the \"baby blues.\" Hormone changes are one cause of these emotional changes. These feelings usually get better within a couple of weeks. If they don't, talk to your doctor or midwife.  In the first couple of weeks after you give birth, your doctor or midwife may want to check in with you and make a plan for follow-up care. You will likely have a " complete postpartum visit in the first 3 months after delivery. At that time, your doctor or midwife will check on your recovery and see how you're doing. But if you have questions or concerns before then, you can always call your doctor or midwife.  Follow-up care is a key part of your treatment and safety. Be sure to make and go to all appointments, and call your doctor if you are having problems. It's also a good idea to know your test results and keep a list of the medicines you take.  How can you care for yourself at home?  Taking care of your body  Use pads instead of tampons for bleeding. After birth, you will have bloody vaginal discharge. You may also pass some blood clots that shouldn't be bigger than an egg. Over the next 6 weeks or so, your bleeding should decrease a little every day and slowly change to a pinkish and then whitish discharge.  For cramps or mild pain, try an over-the-counter pain medicine, such as acetaminophen (Tylenol) or ibuprofen (Advil, Motrin). Read and follow all instructions on the label.  To ease pain around the vagina or from hemorrhoids:  Put ice or a cold pack on the area for 10 to 20 minutes at a time. Put a thin cloth between the ice and your skin.  Try sitting in a few inches of warm water (sitz bath) when you can or after bowel movements.  Clean yourself with a gentle squeeze of warm water from a bottle instead of wiping with toilet paper.  Use witch hazel or hemorrhoid pads (such as Tucks).  Try using a cold compress for sore and swollen breasts. And wear a supportive bra that fits.  Ease constipation by drinking plenty of fluids and eating high-fiber foods. Ask your doctor or midwife about over-the-counter stool softeners.  Activity  Rest when you can.  Ask for help from family or friends when you need it.  If you can, have another adult in your home for at least 2 or 3 days after birth.  When you feel ready, try to get some exercise every day. For many people, walking  is a good choice. Don't do any heavy exercise until your doctor or midwife says it's okay.  Ask your doctor or midwife when it is okay to have vaginal sex.  If you don't want to get pregnant, talk to your doctor or midwife about birth control options. You can get pregnant even before your period returns. Also, you can get pregnant while you are breastfeeding.  Talk to your doctor or midwife if you want to get pregnant again. They can talk to you about when it is safe.  Emotional health  It's normal to have some sadness, anxiety, and mood swings after delivery. It may help to talk with a trusted friend or family member. You can also call the Maternal Mental Health Hotline at 9-532-[facility] (1-941.243.7696) for support. If these mood changes last more than a couple of weeks, talk to your doctor or midwife.  When should you call for help?  Share this information with your partner, family, or a friend. They can help you watch for warning signs.  Call 911  anytime you think you may need emergency care. For example, call if:    You feel you cannot stop from hurting yourself, your baby, or someone else.     You passed out (lost consciousness).     You have chest pain, are short of breath, or cough up blood.     You have a seizure.   Where to get help 24 hours a day, 7 days a week   If you or someone you know talks about suicide, self-harm, a mental health crisis, a substance use crisis, or any other kind of emotional distress, get help right away. You can:    Call the Suicide and Crisis Lifeline at 998.     Call 9-713-919-TALK (1-502.301.9745).     Text HOME to 668058 to access the Crisis Text Line.   Consider saving these numbers in your phone.  Go to [employer] [email or web address] for more information or to chat online.  Call your doctor or midwife now or seek immediate medical care if:    You have signs of hemorrhage (too much bleeding), such as:  Heavy vaginal bleeding. This means that you are soaking through one or more pads in an  "hour. Or you pass blood clots bigger than an egg.  Feeling dizzy or lightheaded, or you feel like you may faint.  Feeling so tired or weak that you cannot do your usual activities.  A fast or irregular heartbeat.  New or worse belly pain.     You have signs of infection, such as:  A fever.  Increased pain, swelling, warmth, or redness from an incision or wound.  Frequent or painful urination or blood in your urine.  Vaginal discharge that smells bad.  New or worse belly pain.     You have symptoms of a blood clot in your leg (called a deep vein thrombosis), such as:  Pain in the calf, back of the knee, thigh, or groin.  Swelling in the leg or groin.  A color change on the leg or groin. The skin may be reddish or purplish, depending on your usual skin color.     You have signs of preeclampsia, such as:  Sudden swelling of your face, hands, or feet.  New vision problems (such as dimness, blurring, or seeing spots).  A severe headache.     You have signs of heart failure, such as:  New or increased shortness of breath.  New or worse swelling in your legs, ankles, or feet.  Sudden weight gain, such as more than 2 to 3 pounds in a day or 5 pounds in a week.  Feeling so tired or weak that you cannot do your usual activities.     You had spinal or epidural pain relief and have:  New or worse back pain.  Increased pain, swelling, warmth, or redness at the injection site.  Tingling, weakness, or numbness in your legs or groin.   Watch closely for changes in your health, and be sure to contact your doctor or midwife if:    Your vaginal bleeding isn't decreasing.     You feel sad, anxious, or hopeless for more than a few days.     You are having problems with your breasts or breastfeeding.   Where can you learn more?  Go to https://www.healthwise.net/patiented  Enter Z768 in the search box to learn more about \"Postpartum Care at Home With Your Baby: Care Instructions.\"  Current as of: July 10, 2023               Content " Version: 14.0    5435-1202 [employer].   Care instructions adapted under license by your healthcare professional. If you have questions about a medical condition or this instruction, always ask your healthcare professional. [employer] disclaims any warranty or liability for your use of this information.

## 2024-07-31 ENCOUNTER — PATIENT OUTREACH (OUTPATIENT)
Dept: CARE COORDINATION | Facility: CLINIC | Age: 34
End: 2024-07-31
Payer: COMMERCIAL

## 2024-07-31 NOTE — PROGRESS NOTES
[facility] Contact  [facility]/Voicemail     Clinical Data: Post-Discharge Outreach     Outreach attempted x 2. No answer, unable to lvm, Incase Pt called to check if they have a questions/concerns and/or to schedule an appt with an [facility] provider, if they do not have a PCP.      Plan:  [facility] will do no further outreaches at this time.       JUAN C Mendoza  [phone]  [facility]

## 2024-08-10 ENCOUNTER — HEALTH MAINTENANCE LETTER (OUTPATIENT)
Age: 34
End: 2024-08-10

## 2024-08-15 ENCOUNTER — NURSE TRIAGE (OUTPATIENT)
Dept: NURSING | Facility: CLINIC | Age: 34
End: 2024-08-15
Payer: COMMERCIAL

## 2024-08-16 NOTE — TELEPHONE ENCOUNTER
Triage Call:     Pt calling with intermittent lower right sided back pain for the last four hours that she describes as an ache   She had a baby at [place] two weeks ago  Right side feels like a pulled muscle, but she states that she does not think it is a pulled muscle  Pain rated: 4-5/10   Still bleeding and cramping from Labor  Feels a pain in her butt at times that feels like she needs to have a BM but doesn't; denies constipation    Disposition: Home Care. Pt was advised to call back with any new or worsening sx. She was given care advice of how to treat her discomfort for now. She was also advised to call back if she would like to make an appt for an in-person evaluation.     Reason for Disposition   Back pain    Additional Information   Negative: Passed out (i.e., lost consciousness, collapsed and was not responding)   Negative: Shock suspected (e.g., cold/pale/clammy skin, too weak to stand, low BP, rapid pulse)   Negative: Sounds like a life-threatening emergency to the triager   Negative: [1] SEVERE back pain (e.g., excruciating) AND [2] sudden onset AND [3] age > 60 years   Negative: [1] Unable to urinate (or only a few drops) > 4 hours AND [2] bladder feels very full (e.g., palpable bladder or strong urge to urinate)   Negative: Numbness in groin or rectal area (i.e., loss of sensation)   Negative: [1] Loss of bladder or bowel control (urine or bowel incontinence; wetting self, leaking stool) AND [2] new-onset   Negative: [1] SEVERE abdominal pain AND [2] present > 1 hour   Negative: [1] Abdominal pain AND [2] age > 60 years   Negative: Weakness of a leg or foot (e.g., unable to bear weight, dragging foot)   Negative: Unable to walk   Negative: Patient sounds very sick or weak to the triager   Negative: [1] SEVERE back pain (e.g., excruciating, unable to do any normal activities) AND [2] not improved 2 hours after pain medicine   Negative: [1] Pain radiates into the thigh or further down the leg AND  "[2] both legs   Negative: [1] Fever > 100.0 F (37.8 C) AND [2] flank pain (i.e., in side, below ribs and above hip)   Negative: [1] Pain or burning with passing urine (urination) AND [2] flank pain (i.e., in side, below ribs and above hip)   Negative: Numbness in a leg or foot (i.e., loss of sensation)   Negative: [1] Numbness in an arm or hand (i.e., loss of sensation) AND [2] upper back pain   Negative: High-risk adult (e.g., history of cancer, HIV, or IV drug use)   Negative: Soft tissue infection (e.g., abscess, cellulitis) or other serious infection (e.g., bacteremia) in last 2 weeks   Negative: Rash in same area as pain (may be described as \"small blisters\")   Negative: [1] Fever AND [2] no symptoms of UTI  (Exception: Has generalized muscle pains, not localized back pain.)   Negative: Blood in urine (red, pink, or tea-colored)   Negative: [1] Pain radiates into the thigh or further down the leg AND [2] one leg   Negative: [1] Age > 50 AND [2] no history of prior similar back pain   Negative: [1] MODERATE back pain (e.g., interferes with normal activities) AND [2] present > 3 days   Negative: Back pain present > 2 weeks   Negative: Back pain is a chronic symptom (recurrent or ongoing AND present > 4 weeks)    Protocols used: Back Pain-A-  Roseanne Payne RN   Triage Nurse Advisor on 8/15/2024 at 9:13 PM  "

## 2024-10-01 ENCOUNTER — TELEPHONE (OUTPATIENT)
Dept: MIDWIFE SERVICES | Facility: CLINIC | Age: 34
End: 2024-10-01
Payer: COMMERCIAL

## 2024-10-01 NOTE — TELEPHONE ENCOUNTER
[facility] Call Center    Phone Message    May a detailed message be left on voicemail: yes     Reason for Call: Other: Patient called wanting to schedule a 6 week PP visit. Made 8 weeks PP on 9/22 would be 9 weeks 2 days PP as of today. No available appts this week for return long. Patient worried because she is having stomach pains states this is her 4th child and she's never had stomach pains beyond this point. Also states her menstrual lasted about 2 weeks just ended. Please contact patient in regards to this message. Thank you     Action Taken: Other: Women's    Travel Screening: Not Applicable     Date of Service:

## 2024-10-10 ENCOUNTER — PRENATAL OFFICE VISIT (OUTPATIENT)
Dept: MIDWIFE SERVICES | Facility: CLINIC | Age: 34
End: 2024-10-10
Payer: COMMERCIAL

## 2024-10-10 VITALS
OXYGEN SATURATION: 98 % | HEART RATE: 74 BPM | BODY MASS INDEX: 20.8 KG/M2 | HEIGHT: 67 IN | TEMPERATURE: 97.6 F | DIASTOLIC BLOOD PRESSURE: 71 MMHG | SYSTOLIC BLOOD PRESSURE: 109 MMHG | WEIGHT: 132.5 LBS

## 2024-10-10 DIAGNOSIS — Z13.0 SCREENING FOR IRON DEFICIENCY ANEMIA: ICD-10-CM

## 2024-10-10 DIAGNOSIS — Z11.3 SCREEN FOR STD (SEXUALLY TRANSMITTED DISEASE): ICD-10-CM

## 2024-10-10 DIAGNOSIS — F39 MOOD DISORDER (H): ICD-10-CM

## 2024-10-10 DIAGNOSIS — N94.89 UTERINE CRAMPING: ICD-10-CM

## 2024-10-10 DIAGNOSIS — Z13.29 SCREENING FOR THYROID DISORDER: ICD-10-CM

## 2024-10-10 LAB
ALBUMIN UR-MCNC: NEGATIVE MG/DL
APPEARANCE UR: CLEAR
BACTERIA #/AREA URNS HPF: ABNORMAL /HPF
BILIRUB UR QL STRIP: NEGATIVE
COLOR UR AUTO: YELLOW
ERYTHROCYTE [DISTWIDTH] IN BLOOD BY AUTOMATED COUNT: 13.5 % (ref 10–15)
GLUCOSE UR STRIP-MCNC: NEGATIVE MG/DL
HCT VFR BLD AUTO: 36.1 % (ref 35–47)
HGB BLD-MCNC: 11.7 G/DL (ref 11.7–15.7)
HGB UR QL STRIP: NEGATIVE
KETONES UR STRIP-MCNC: NEGATIVE MG/DL
LEUKOCYTE ESTERASE UR QL STRIP: ABNORMAL
MCH RBC QN AUTO: 27.9 PG (ref 26.5–33)
MCHC RBC AUTO-ENTMCNC: 32.4 G/DL (ref 31.5–36.5)
MCV RBC AUTO: 86 FL (ref 78–100)
MUCOUS THREADS #/AREA URNS LPF: PRESENT /LPF
NITRATE UR QL: POSITIVE
PH UR STRIP: 6 [PH] (ref 5–7)
PLATELET # BLD AUTO: 230 10E3/UL (ref 150–450)
RBC # BLD AUTO: 4.19 10E6/UL (ref 3.8–5.2)
RBC #/AREA URNS AUTO: ABNORMAL /HPF
SP GR UR STRIP: 1.01 (ref 1–1.03)
SQUAMOUS #/AREA URNS AUTO: ABNORMAL /LPF
TRANS CELLS #/AREA URNS HPF: ABNORMAL /HPF
UROBILINOGEN UR STRIP-ACNC: 0.2 E.U./DL
WBC # BLD AUTO: 5.4 10E3/UL (ref 4–11)
WBC #/AREA URNS AUTO: ABNORMAL /HPF
WBC CLUMPS #/AREA URNS HPF: PRESENT /HPF

## 2024-10-10 PROCEDURE — 85027 COMPLETE CBC AUTOMATED: CPT | Performed by: ADVANCED PRACTICE MIDWIFE

## 2024-10-10 PROCEDURE — 87186 SC STD MICRODIL/AGAR DIL: CPT | Performed by: ADVANCED PRACTICE MIDWIFE

## 2024-10-10 PROCEDURE — 99213 OFFICE O/P EST LOW 20 MIN: CPT | Performed by: ADVANCED PRACTICE MIDWIFE

## 2024-10-10 PROCEDURE — 87086 URINE CULTURE/COLONY COUNT: CPT | Performed by: ADVANCED PRACTICE MIDWIFE

## 2024-10-10 PROCEDURE — 84443 ASSAY THYROID STIM HORMONE: CPT | Performed by: ADVANCED PRACTICE MIDWIFE

## 2024-10-10 PROCEDURE — 2894A URINE CULTURE: CPT | Performed by: ADVANCED PRACTICE MIDWIFE

## 2024-10-10 PROCEDURE — 36415 COLL VENOUS BLD VENIPUNCTURE: CPT | Performed by: ADVANCED PRACTICE MIDWIFE

## 2024-10-10 PROCEDURE — 81001 URINALYSIS AUTO W/SCOPE: CPT | Performed by: ADVANCED PRACTICE MIDWIFE

## 2024-10-10 PROCEDURE — 99207 PR POST PARTUM EXAM: CPT | Performed by: ADVANCED PRACTICE MIDWIFE

## 2024-10-10 ASSESSMENT — PATIENT HEALTH QUESTIONNAIRE - PHQ9: SUM OF ALL RESPONSES TO PHQ QUESTIONS 1-9: 8

## 2024-10-11 ENCOUNTER — TELEPHONE (OUTPATIENT)
Dept: MIDWIFE SERVICES | Facility: CLINIC | Age: 34
End: 2024-10-11

## 2024-10-11 DIAGNOSIS — Z11.3 SCREEN FOR STD (SEXUALLY TRANSMITTED DISEASE): Primary | ICD-10-CM

## 2024-10-11 LAB — TSH SERPL DL<=0.005 MIU/L-ACNC: 0.76 UIU/ML (ref 0.3–4.2)

## 2024-10-11 NOTE — PROGRESS NOTES
"SUBJECTIVE:   Polly Kent is here for her postpartum checkup. It has been almost 10 weeks since the birth of her baby. She reports overall her recovery has been going well but she has some specific concerns today.     An occasional shooting pain in her uterus - related it to uterus returning to pre-pregnancy size and position but wants to make sure. Denies any vaginal symptoms including itching, burning, irritation or change in discharge amount of odor. No urinary symptoms.    Burning sensation in stomach - thinks this is heartburn. Has not been able to attribute to certain foods. Does not happen frequently enough. She is able to resolve it by taking some baking soda in water.   Headaches - just the last few days, mild.   Concerns about her mood - feels short tempered and yells at kids a lot. Has always been this way and is ready to work on it. Feels like she has ADHD or possibly depression but isn't sure where to start. Would prefer to work on behavioral approaches as opposed to starting medication. Is not concerned about harming herself or others but does state she is hard on herself.     DELIVERY DATE: 2024   of a viable boy, weight 8 pounds 7 oz., with no complications.  FEEDING METHOD:Bottlefed    CONTRACEPTION PLANNED: Nexplanon in place - got at [facility]  She  has had intercourse since delivery and complains of No discomfort.  OTHER HPI: She has No signs of infection, bleeding or other complications. Bleeding stopped, epis/lac healing well.         EXAM:  /71   Pulse 74   Temp 97.6  F (36.4  C)   Ht 1.702 m (5' 7\")   Wt 60.1 kg (132 lb 8 oz)   LMP 2024 (Approximate)   SpO2 98%   Breastfeeding No   BMI 20.75 kg/m    GENERAL APPEARANCE: healthy, alert and no distress  NECK: thyroid normal to palpation  BREAST: soft, nontender, nipples intact, lactating   ABDOMEN: soft, nontender, diastasis recti closing  PSYCH: mentation appears normal and affect " normal/bright    ASSESSMENT/PLAN:   (Z39.2) Routine postpartum follow-up  (primary encounter diagnosis)  Comment:   Plan:     (Z11.3) Screen for STD (sexually transmitted disease)  Comment:   Plan: CANCELED: NEISSERIA GONORRHOEA PCR, CANCELED:         CHLAMYDIA TRACHOMATIS PCR            (N94.89) Uterine cramping  Comment:   Plan: UA with Microscopic reflex to Culture - lab         collect, Wet prep - lab collect, Urine         Microscopic Exam, Urine Culture            (Z13.0) Screening for iron deficiency anemia  Comment:   Plan: CBC with platelets            (Z13.29) Screening for thyroid disorder  Comment:   Plan: TSH with free T4 reflex            (F39) Mood disorder (H)  Comment:   Plan: Adult Mental Health  Referral          We discussed ruling out infection for cause of uterine cramping and if negative would assume normal sensations related to uterine involution.     Will notify patient of lab results via [employer]    RTC yearly for annual exam or sooner prn  Sudha Hallman CNM

## 2024-10-11 NOTE — TELEPHONE ENCOUNTER
Urine is fine - but make sure it is a dirty catch, do not use wipes before collecting sample. Thanks, Sudha

## 2024-10-11 NOTE — TELEPHONE ENCOUNTER
Lab had to cancel Gc/CT d/t pt accidentally puncturing the tube lid instead of lifting it up?  Want to do swab repeat or urine?  Routing to provider to advise.  Grisel eKen RN on 10/11/2024 at 8:33 AM

## 2024-10-11 NOTE — TELEPHONE ENCOUNTER
Pt Gc/Ct had to be cancelled d/t issue with tube.   Attempted to call pt to let her know to come back and can leave dirty urine (don't swab before).  No answer, LMTCB and [employer] message sent.  Grisel Keen RN on 10/11/2024 at 9:52 AM    Pt read [employer] message, Urine Gc/Ct ordered. Closing TE.  Grisel Keen RN on 10/11/2024 at 4:04 PM

## 2024-10-12 LAB
BACTERIA UR CULT: ABNORMAL
BACTERIA UR CULT: ABNORMAL

## 2024-10-13 ENCOUNTER — TELEPHONE (OUTPATIENT)
Dept: MIDWIFE SERVICES | Facility: CLINIC | Age: 34
End: 2024-10-13
Payer: COMMERCIAL

## 2024-10-13 DIAGNOSIS — N39.0 URINARY TRACT INFECTION WITHOUT HEMATURIA, SITE UNSPECIFIED: Primary | ICD-10-CM

## 2024-10-13 RX ORDER — NITROFURANTOIN 25; 75 MG/1; MG/1
100 CAPSULE ORAL 2 TIMES DAILY
Qty: 14 CAPSULE | Refills: 0 | Status: SHIPPED | OUTPATIENT
Start: 2024-10-13

## 2024-10-17 ENCOUNTER — PATIENT OUTREACH (OUTPATIENT)
Dept: BEHAVIORAL HEALTH | Facility: CLINIC | Age: 34
End: 2024-10-17

## 2024-10-20 NOTE — TELEPHONE ENCOUNTER
Writer contacted patient to reschedule appointment. Write left voice message to return call.    LEIGH Brandt, MA

## 2025-08-16 ENCOUNTER — HEALTH MAINTENANCE LETTER (OUTPATIENT)
Age: 35
End: 2025-08-16